# Patient Record
Sex: FEMALE | Race: BLACK OR AFRICAN AMERICAN | NOT HISPANIC OR LATINO | URBAN - METROPOLITAN AREA
[De-identification: names, ages, dates, MRNs, and addresses within clinical notes are randomized per-mention and may not be internally consistent; named-entity substitution may affect disease eponyms.]

---

## 2018-07-19 ENCOUNTER — INPATIENT (INPATIENT)
Facility: HOSPITAL | Age: 73
LOS: 3 days | Discharge: HOME | End: 2018-07-23
Attending: SPECIALIST | Admitting: SPECIALIST
Payer: MEDICARE

## 2018-07-19 VITALS
HEART RATE: 58 BPM | OXYGEN SATURATION: 100 % | RESPIRATION RATE: 18 BRPM | SYSTOLIC BLOOD PRESSURE: 150 MMHG | DIASTOLIC BLOOD PRESSURE: 82 MMHG | TEMPERATURE: 98 F

## 2018-07-19 DIAGNOSIS — R10.9 UNSPECIFIED ABDOMINAL PAIN: ICD-10-CM

## 2018-07-19 LAB
APTT BLD: 29.4 SEC — SIGNIFICANT CHANGE UP (ref 27.5–37.4)
BASE EXCESS BLDV CALC-SCNC: 5.5 MMOL/L — SIGNIFICANT CHANGE UP
BASOPHILS # BLD AUTO: 0.03 K/UL — SIGNIFICANT CHANGE UP (ref 0–0.2)
BASOPHILS NFR BLD AUTO: 0.3 % — SIGNIFICANT CHANGE UP (ref 0–2)
BLD GP AB SCN SERPL QL: NEGATIVE — SIGNIFICANT CHANGE UP
BLOOD GAS VENOUS - CREATININE: 0.86 MG/DL — SIGNIFICANT CHANGE UP (ref 0.5–1.3)
BUN SERPL-MCNC: 12 MG/DL — SIGNIFICANT CHANGE UP (ref 7–23)
CALCIUM SERPL-MCNC: 9.9 MG/DL — SIGNIFICANT CHANGE UP (ref 8.4–10.5)
CHLORIDE BLDV-SCNC: 106 MMOL/L — SIGNIFICANT CHANGE UP (ref 96–108)
CHLORIDE SERPL-SCNC: 101 MMOL/L — SIGNIFICANT CHANGE UP (ref 98–107)
CO2 SERPL-SCNC: 24 MMOL/L — SIGNIFICANT CHANGE UP (ref 22–31)
CREAT SERPL-MCNC: 0.93 MG/DL — SIGNIFICANT CHANGE UP (ref 0.5–1.3)
EOSINOPHIL # BLD AUTO: 0.13 K/UL — SIGNIFICANT CHANGE UP (ref 0–0.5)
EOSINOPHIL NFR BLD AUTO: 1.5 % — SIGNIFICANT CHANGE UP (ref 0–6)
GAS PNL BLDV: 135 MMOL/L — LOW (ref 136–146)
GLUCOSE BLDV-MCNC: 153 — HIGH (ref 70–99)
GLUCOSE SERPL-MCNC: 150 MG/DL — HIGH (ref 70–99)
HCO3 BLDV-SCNC: 27 MMOL/L — SIGNIFICANT CHANGE UP (ref 20–27)
HCT VFR BLD CALC: 37.7 % — SIGNIFICANT CHANGE UP (ref 34.5–45)
HCT VFR BLDV CALC: 38.7 % — SIGNIFICANT CHANGE UP (ref 34.5–45)
HGB BLD-MCNC: 12.2 G/DL — SIGNIFICANT CHANGE UP (ref 11.5–15.5)
HGB BLDV-MCNC: 12.6 G/DL — SIGNIFICANT CHANGE UP (ref 11.5–15.5)
IMM GRANULOCYTES # BLD AUTO: 0.04 # — SIGNIFICANT CHANGE UP
IMM GRANULOCYTES NFR BLD AUTO: 0.5 % — SIGNIFICANT CHANGE UP (ref 0–1.5)
INR BLD: 1.08 — SIGNIFICANT CHANGE UP (ref 0.88–1.17)
LACTATE BLDV-MCNC: 1.5 MMOL/L — SIGNIFICANT CHANGE UP (ref 0.5–2)
LIDOCAIN IGE QN: 266.6 U/L — HIGH (ref 7–60)
LYMPHOCYTES # BLD AUTO: 2.39 K/UL — SIGNIFICANT CHANGE UP (ref 1–3.3)
LYMPHOCYTES # BLD AUTO: 27.2 % — SIGNIFICANT CHANGE UP (ref 13–44)
MCHC RBC-ENTMCNC: 29.7 PG — SIGNIFICANT CHANGE UP (ref 27–34)
MCHC RBC-ENTMCNC: 32.4 % — SIGNIFICANT CHANGE UP (ref 32–36)
MCV RBC AUTO: 91.7 FL — SIGNIFICANT CHANGE UP (ref 80–100)
MONOCYTES # BLD AUTO: 0.92 K/UL — HIGH (ref 0–0.9)
MONOCYTES NFR BLD AUTO: 10.5 % — SIGNIFICANT CHANGE UP (ref 2–14)
NEUTROPHILS # BLD AUTO: 5.27 K/UL — SIGNIFICANT CHANGE UP (ref 1.8–7.4)
NEUTROPHILS NFR BLD AUTO: 60 % — SIGNIFICANT CHANGE UP (ref 43–77)
NRBC # FLD: 0 — SIGNIFICANT CHANGE UP
PCO2 BLDV: 53 MMHG — HIGH (ref 41–51)
PH BLDV: 7.38 PH — SIGNIFICANT CHANGE UP (ref 7.32–7.43)
PLATELET # BLD AUTO: 188 K/UL — SIGNIFICANT CHANGE UP (ref 150–400)
PMV BLD: 10.6 FL — SIGNIFICANT CHANGE UP (ref 7–13)
PO2 BLDV: 28 MMHG — LOW (ref 35–40)
POTASSIUM BLDV-SCNC: 3.8 MMOL/L — SIGNIFICANT CHANGE UP (ref 3.4–4.5)
POTASSIUM SERPL-MCNC: 4.1 MMOL/L — SIGNIFICANT CHANGE UP (ref 3.5–5.3)
POTASSIUM SERPL-SCNC: 4.1 MMOL/L — SIGNIFICANT CHANGE UP (ref 3.5–5.3)
PROTHROM AB SERPL-ACNC: 12 SEC — SIGNIFICANT CHANGE UP (ref 9.8–13.1)
RBC # BLD: 4.11 M/UL — SIGNIFICANT CHANGE UP (ref 3.8–5.2)
RBC # FLD: 12.5 % — SIGNIFICANT CHANGE UP (ref 10.3–14.5)
RH IG SCN BLD-IMP: POSITIVE — SIGNIFICANT CHANGE UP
SAO2 % BLDV: 44.2 % — LOW (ref 60–85)
SODIUM SERPL-SCNC: 140 MMOL/L — SIGNIFICANT CHANGE UP (ref 135–145)
WBC # BLD: 8.78 K/UL — SIGNIFICANT CHANGE UP (ref 3.8–10.5)
WBC # FLD AUTO: 8.78 K/UL — SIGNIFICANT CHANGE UP (ref 3.8–10.5)

## 2018-07-19 PROCEDURE — 74177 CT ABD & PELVIS W/CONTRAST: CPT | Mod: 26

## 2018-07-19 PROCEDURE — 71046 X-RAY EXAM CHEST 2 VIEWS: CPT | Mod: 26

## 2018-07-19 PROCEDURE — 74019 RADEX ABDOMEN 2 VIEWS: CPT | Mod: 26

## 2018-07-19 RX ORDER — ASPIRIN/CALCIUM CARB/MAGNESIUM 324 MG
325 TABLET ORAL DAILY
Qty: 0 | Refills: 0 | Status: DISCONTINUED | OUTPATIENT
Start: 2018-07-19 | End: 2018-07-23

## 2018-07-19 RX ORDER — ACETAMINOPHEN 500 MG
975 TABLET ORAL ONCE
Qty: 0 | Refills: 0 | Status: DISCONTINUED | OUTPATIENT
Start: 2018-07-19 | End: 2018-07-19

## 2018-07-19 RX ORDER — FAMOTIDINE 10 MG/ML
20 INJECTION INTRAVENOUS ONCE
Qty: 0 | Refills: 0 | Status: COMPLETED | OUTPATIENT
Start: 2018-07-19 | End: 2018-07-19

## 2018-07-19 RX ORDER — SODIUM CHLORIDE 9 MG/ML
1000 INJECTION INTRAMUSCULAR; INTRAVENOUS; SUBCUTANEOUS ONCE
Qty: 0 | Refills: 0 | Status: COMPLETED | OUTPATIENT
Start: 2018-07-19 | End: 2018-07-19

## 2018-07-19 RX ORDER — ATENOLOL 25 MG/1
50 TABLET ORAL DAILY
Qty: 0 | Refills: 0 | Status: DISCONTINUED | OUTPATIENT
Start: 2018-07-19 | End: 2018-07-23

## 2018-07-19 RX ORDER — ACETAMINOPHEN 500 MG
1000 TABLET ORAL ONCE
Qty: 0 | Refills: 0 | Status: COMPLETED | OUTPATIENT
Start: 2018-07-20 | End: 2018-07-20

## 2018-07-19 RX ORDER — ACETAMINOPHEN 500 MG
1000 TABLET ORAL ONCE
Qty: 0 | Refills: 0 | Status: COMPLETED | OUTPATIENT
Start: 2018-07-19 | End: 2018-07-19

## 2018-07-19 RX ORDER — LISINOPRIL 2.5 MG/1
40 TABLET ORAL DAILY
Qty: 0 | Refills: 0 | Status: DISCONTINUED | OUTPATIENT
Start: 2018-07-19 | End: 2018-07-23

## 2018-07-19 RX ORDER — MORPHINE SULFATE 50 MG/1
4 CAPSULE, EXTENDED RELEASE ORAL ONCE
Qty: 0 | Refills: 0 | Status: DISCONTINUED | OUTPATIENT
Start: 2018-07-19 | End: 2018-07-19

## 2018-07-19 RX ORDER — HYDROCHLOROTHIAZIDE 25 MG
25 TABLET ORAL DAILY
Qty: 0 | Refills: 0 | Status: DISCONTINUED | OUTPATIENT
Start: 2018-07-20 | End: 2018-07-23

## 2018-07-19 RX ORDER — KETOROLAC TROMETHAMINE 30 MG/ML
15 SYRINGE (ML) INJECTION ONCE
Qty: 0 | Refills: 0 | Status: DISCONTINUED | OUTPATIENT
Start: 2018-07-19 | End: 2018-07-19

## 2018-07-19 RX ORDER — SODIUM CHLORIDE 9 MG/ML
1000 INJECTION, SOLUTION INTRAVENOUS
Qty: 0 | Refills: 0 | Status: DISCONTINUED | OUTPATIENT
Start: 2018-07-19 | End: 2018-07-20

## 2018-07-19 RX ORDER — ONDANSETRON 8 MG/1
4 TABLET, FILM COATED ORAL EVERY 6 HOURS
Qty: 0 | Refills: 0 | Status: DISCONTINUED | OUTPATIENT
Start: 2018-07-19 | End: 2018-07-23

## 2018-07-19 RX ORDER — HYDROMORPHONE HYDROCHLORIDE 2 MG/ML
1 INJECTION INTRAMUSCULAR; INTRAVENOUS; SUBCUTANEOUS EVERY 4 HOURS
Qty: 0 | Refills: 0 | Status: DISCONTINUED | OUTPATIENT
Start: 2018-07-19 | End: 2018-07-23

## 2018-07-19 RX ORDER — HYDROMORPHONE HYDROCHLORIDE 2 MG/ML
0.5 INJECTION INTRAMUSCULAR; INTRAVENOUS; SUBCUTANEOUS EVERY 4 HOURS
Qty: 0 | Refills: 0 | Status: DISCONTINUED | OUTPATIENT
Start: 2018-07-19 | End: 2018-07-23

## 2018-07-19 RX ORDER — HEPARIN SODIUM 5000 [USP'U]/ML
5000 INJECTION INTRAVENOUS; SUBCUTANEOUS EVERY 8 HOURS
Qty: 0 | Refills: 0 | Status: DISCONTINUED | OUTPATIENT
Start: 2018-07-19 | End: 2018-07-22

## 2018-07-19 RX ADMIN — MORPHINE SULFATE 4 MILLIGRAM(S): 50 CAPSULE, EXTENDED RELEASE ORAL at 19:49

## 2018-07-19 RX ADMIN — MORPHINE SULFATE 4 MILLIGRAM(S): 50 CAPSULE, EXTENDED RELEASE ORAL at 16:55

## 2018-07-19 RX ADMIN — Medication 400 MILLIGRAM(S): at 22:57

## 2018-07-19 RX ADMIN — SODIUM CHLORIDE 2000 MILLILITER(S): 9 INJECTION INTRAMUSCULAR; INTRAVENOUS; SUBCUTANEOUS at 15:30

## 2018-07-19 RX ADMIN — MORPHINE SULFATE 4 MILLIGRAM(S): 50 CAPSULE, EXTENDED RELEASE ORAL at 20:12

## 2018-07-19 RX ADMIN — Medication 1000 MILLIGRAM(S): at 23:57

## 2018-07-19 RX ADMIN — SODIUM CHLORIDE 1000 MILLILITER(S): 9 INJECTION INTRAMUSCULAR; INTRAVENOUS; SUBCUTANEOUS at 19:35

## 2018-07-19 RX ADMIN — FAMOTIDINE 20 MILLIGRAM(S): 10 INJECTION INTRAVENOUS at 15:36

## 2018-07-19 RX ADMIN — MORPHINE SULFATE 4 MILLIGRAM(S): 50 CAPSULE, EXTENDED RELEASE ORAL at 15:36

## 2018-07-19 NOTE — H&P ADULT - ASSESSMENT
Wendie Hobbs is a 72 y.o. woman with history of DM, CAD s/p stents (2003), HLD, and HTN who presented to the ED with 2 weeks of increasing epigastric and LUQ abdominal pain found to have acute pancreatitis.     Plan:   - NPO  - IVF  - Pain management  - Serial abdominal exams  - Discussed with Dr. Lorraine Hay, PGY2  [id number]

## 2018-07-19 NOTE — H&P ADULT - NSHPLABSRESULTS_GEN_ALL_CORE
CBC (07-19 @ 14:26)                              12.2                           8.78    )----------------(  188        60.0  % Neutrophils, 27.2  % Lymphocytes, ANC: 5.27                                37.7                  BMP (07-19 @ 14:55)             140     |  101     |  12    		Ca++ --      Ca 9.9                ---------------------------------( 150<H>		Mg --                 4.1     |  24      |  0.93  			Ph --          Coags (07-19 @ 15:42)  aPTT 29.4 / INR 1.08 / PT 12.0    ABG (07-19 @ 14:26)      /  /  /  /  / %     Lactate:   1.5    VBG (07-19 @ 14:26)     7.38 / 53<H> / 28<L> / 27 / 5.5 / 44.2<L>%    < from: CT Abdomen and Pelvis w/ Oral Cont and w/ IV Cont (07.19.18 @ 20:14) >    LOWER CHEST: Coronary artery and aortic valvular calcifications..    LIVER: Within normal limits.  BILE DUCTS: Normal caliber.  GALLBLADDER: Status post cholecystectomy.  SPLEEN: Within normal limits.  PANCREAS: Mild fat stranding at the pancreatic head an uncinate process   suggestive of mild edematous pancreatitis.. No abnormal pancreatic   enhancement.  ADRENALS: Within normal limits.  KIDNEYS/URETERS: Within normal limits.    BLADDER: Within normal limits.  REPRODUCTIVE ORGANS: Fibroid uterus.    BOWEL: No bowel obstruction. Appendix is normal.  PERITONEUM: No ascites.  VESSELS:  Atherosclerotic changes of the aorta and its branches.  RETROPERITONEUM: No lymphadenopathy.    ABDOMINAL WALL: Tiny fat-containing umbilical hernia.   BONES: Mild degenerative spinal changes.    IMPRESSION:  Mild pancreatitis of the pancreatic head and uncinate process.    < end of copied text >

## 2018-07-19 NOTE — H&P ADULT - NSHPPHYSICALEXAM_GEN_ALL_CORE
Constitutional: NAD, AOx3  Respiratory: nonlabored  Cardiovascular: normotensive, regular rate  Gastrointestinal: soft, distended, epigastric > LUQ pain, no rebound, no guarding  Extremities: warm, well perfused

## 2018-07-19 NOTE — ED PROVIDER NOTE - MEDICAL DECISION MAKING DETAILS
72F presenting with 2 weeks ago abd pain in epigastric region. Passing BM and flatus. DDx includes gastritis, gerd, pancreatitis with low suspicion of obstructive process. WIll obtain CT and reassess after labs

## 2018-07-19 NOTE — H&P ADULT - HISTORY OF PRESENT ILLNESS
Wendie Hobbs is a 72 y.o. woman with history of DM, CAD s/p stents (2003), HLD, and HTN who presented to the ED with 2 weeks of increasing epigastric and LUQ abdominal pain.     The patient states that her pain began after she was started on a new medication, Metformin, by her primary care physician. The patient states that he pain slowly increased and she started to have associated abdominal distention and diarrhea. The patient also states that she began feeling SOB when walking long distances. The patient returned to her primary care physician 1 week ago and her dose of Metformin was decreased, which helped with her pain and other symptoms. However, she was finally prompted to come in the the persistence of her symptoms.     The patient denies any associated fever, nausea, vomiting, or diaphoresis.

## 2018-07-19 NOTE — ED ADULT TRIAGE NOTE - CHIEF COMPLAINT QUOTE
Patient takes metformin, dosage recently increased and now has had abdominal pain, bloating, diarrhea, acidic taste in her mouth, and epigastric abdominal pain for 1.5 weeks.  Endorses labored breathing when walking.  Denies chest pain.  Last BM this morning, reports normal.  .  Arrives from PMD for abdominal distension.   Hx DM, HTN

## 2018-07-19 NOTE — ED PROVIDER NOTE - ATTENDING CONTRIBUTION TO CARE
I performed a face to face evaluation of this patient and performed a full history and physical examination on the patient.  I agree with the resident's history, physical examination, and plan of the patient. Pt with abd pain and increasing distention x 2 weeks, + flatus, no dysuria, n/v/d, abd distended soft, mild diffuse ttp, no rebound + BS

## 2018-07-19 NOTE — ED PROVIDER NOTE - OBJECTIVE STATEMENT
72F with PMH DM, HTN, HLD, CAD, GERD presenting with abdominal pain x 2 weeks, epigastric region. States that near onset her pcp increased her metformin dose and began having diarrhea and epigastric pain. Symptoms worse with food. Decreased appetite because of inability to tolerate PO. Mild headache associated with symptoms along with some shortness of breath. States that diarrhea has now resolved, now with small stools daily. Denies fever, chills, cp, n/v, dysuria

## 2018-07-20 LAB
ALBUMIN SERPL ELPH-MCNC: 3.1 G/DL — LOW (ref 3.3–5)
ALP SERPL-CCNC: 66 U/L — SIGNIFICANT CHANGE UP (ref 40–120)
ALT FLD-CCNC: 19 U/L — SIGNIFICANT CHANGE UP (ref 4–33)
AMYLASE P1 CFR SERPL: 93 U/L — SIGNIFICANT CHANGE UP (ref 25–125)
APPEARANCE UR: CLEAR — SIGNIFICANT CHANGE UP
AST SERPL-CCNC: 20 U/L — SIGNIFICANT CHANGE UP (ref 4–32)
BILIRUB DIRECT SERPL-MCNC: 0.1 MG/DL — SIGNIFICANT CHANGE UP (ref 0.1–0.2)
BILIRUB SERPL-MCNC: 0.6 MG/DL — SIGNIFICANT CHANGE UP (ref 0.2–1.2)
BILIRUB UR-MCNC: NEGATIVE — SIGNIFICANT CHANGE UP
BLOOD UR QL VISUAL: NEGATIVE — SIGNIFICANT CHANGE UP
BUN SERPL-MCNC: 9 MG/DL — SIGNIFICANT CHANGE UP (ref 7–23)
CALCIUM SERPL-MCNC: 8.9 MG/DL — SIGNIFICANT CHANGE UP (ref 8.4–10.5)
CHLORIDE SERPL-SCNC: 103 MMOL/L — SIGNIFICANT CHANGE UP (ref 98–107)
CO2 SERPL-SCNC: 25 MMOL/L — SIGNIFICANT CHANGE UP (ref 22–31)
COLOR SPEC: COLORLESS — SIGNIFICANT CHANGE UP
CREAT SERPL-MCNC: 0.8 MG/DL — SIGNIFICANT CHANGE UP (ref 0.5–1.3)
GLUCOSE BLDC GLUCOMTR-MCNC: 104 MG/DL — HIGH (ref 70–99)
GLUCOSE BLDC GLUCOMTR-MCNC: 131 MG/DL — HIGH (ref 70–99)
GLUCOSE BLDC GLUCOMTR-MCNC: 231 MG/DL — HIGH (ref 70–99)
GLUCOSE SERPL-MCNC: 132 MG/DL — HIGH (ref 70–99)
GLUCOSE UR-MCNC: NEGATIVE — SIGNIFICANT CHANGE UP
HCT VFR BLD CALC: 34.8 % — SIGNIFICANT CHANGE UP (ref 34.5–45)
HGB BLD-MCNC: 11.6 G/DL — SIGNIFICANT CHANGE UP (ref 11.5–15.5)
KETONES UR-MCNC: NEGATIVE — SIGNIFICANT CHANGE UP
LEUKOCYTE ESTERASE UR-ACNC: NEGATIVE — SIGNIFICANT CHANGE UP
LIDOCAIN IGE QN: 104 U/L — HIGH (ref 7–60)
MAGNESIUM SERPL-MCNC: 1.2 MG/DL — LOW (ref 1.6–2.6)
MCHC RBC-ENTMCNC: 30.5 PG — SIGNIFICANT CHANGE UP (ref 27–34)
MCHC RBC-ENTMCNC: 33.3 % — SIGNIFICANT CHANGE UP (ref 32–36)
MCV RBC AUTO: 91.6 FL — SIGNIFICANT CHANGE UP (ref 80–100)
NITRITE UR-MCNC: NEGATIVE — SIGNIFICANT CHANGE UP
NRBC # FLD: 0 — SIGNIFICANT CHANGE UP
PH UR: 6.5 — SIGNIFICANT CHANGE UP (ref 4.6–8)
PHOSPHATE SERPL-MCNC: 3.9 MG/DL — SIGNIFICANT CHANGE UP (ref 2.5–4.5)
PLATELET # BLD AUTO: 169 K/UL — SIGNIFICANT CHANGE UP (ref 150–400)
PMV BLD: 10.7 FL — SIGNIFICANT CHANGE UP (ref 7–13)
POTASSIUM SERPL-MCNC: 3.7 MMOL/L — SIGNIFICANT CHANGE UP (ref 3.5–5.3)
POTASSIUM SERPL-SCNC: 3.7 MMOL/L — SIGNIFICANT CHANGE UP (ref 3.5–5.3)
PROT SERPL-MCNC: 6.4 G/DL — SIGNIFICANT CHANGE UP (ref 6–8.3)
PROT UR-MCNC: NEGATIVE MG/DL — SIGNIFICANT CHANGE UP
RBC # BLD: 3.8 M/UL — SIGNIFICANT CHANGE UP (ref 3.8–5.2)
RBC # FLD: 12.8 % — SIGNIFICANT CHANGE UP (ref 10.3–14.5)
RBC CASTS # UR COMP ASSIST: SIGNIFICANT CHANGE UP (ref 0–?)
SODIUM SERPL-SCNC: 141 MMOL/L — SIGNIFICANT CHANGE UP (ref 135–145)
SP GR SPEC: 1.01 — SIGNIFICANT CHANGE UP (ref 1–1.04)
SQUAMOUS # UR AUTO: SIGNIFICANT CHANGE UP
UROBILINOGEN FLD QL: NORMAL MG/DL — SIGNIFICANT CHANGE UP
WBC # BLD: 7.34 K/UL — SIGNIFICANT CHANGE UP (ref 3.8–10.5)
WBC # FLD AUTO: 7.34 K/UL — SIGNIFICANT CHANGE UP (ref 3.8–10.5)
WBC UR QL: SIGNIFICANT CHANGE UP (ref 0–?)

## 2018-07-20 RX ORDER — MAGNESIUM SULFATE 500 MG/ML
2 VIAL (ML) INJECTION ONCE
Qty: 0 | Refills: 0 | Status: DISCONTINUED | OUTPATIENT
Start: 2018-07-20 | End: 2018-07-20

## 2018-07-20 RX ORDER — MAGNESIUM SULFATE 500 MG/ML
2 VIAL (ML) INJECTION ONCE
Qty: 0 | Refills: 0 | Status: COMPLETED | OUTPATIENT
Start: 2018-07-20 | End: 2018-07-20

## 2018-07-20 RX ORDER — DEXTROSE 50 % IN WATER 50 %
15 SYRINGE (ML) INTRAVENOUS ONCE
Qty: 0 | Refills: 0 | Status: DISCONTINUED | OUTPATIENT
Start: 2018-07-20 | End: 2018-07-23

## 2018-07-20 RX ORDER — SODIUM CHLORIDE 9 MG/ML
1000 INJECTION, SOLUTION INTRAVENOUS
Qty: 0 | Refills: 0 | Status: DISCONTINUED | OUTPATIENT
Start: 2018-07-20 | End: 2018-07-23

## 2018-07-20 RX ORDER — DEXTROSE 50 % IN WATER 50 %
12.5 SYRINGE (ML) INTRAVENOUS ONCE
Qty: 0 | Refills: 0 | Status: DISCONTINUED | OUTPATIENT
Start: 2018-07-20 | End: 2018-07-23

## 2018-07-20 RX ORDER — DEXTROSE 50 % IN WATER 50 %
25 SYRINGE (ML) INTRAVENOUS ONCE
Qty: 0 | Refills: 0 | Status: DISCONTINUED | OUTPATIENT
Start: 2018-07-20 | End: 2018-07-23

## 2018-07-20 RX ORDER — POTASSIUM CHLORIDE 20 MEQ
40 PACKET (EA) ORAL ONCE
Qty: 0 | Refills: 0 | Status: COMPLETED | OUTPATIENT
Start: 2018-07-20 | End: 2018-07-20

## 2018-07-20 RX ORDER — DEXTROSE MONOHYDRATE, SODIUM CHLORIDE, AND POTASSIUM CHLORIDE 50; .745; 4.5 G/1000ML; G/1000ML; G/1000ML
1000 INJECTION, SOLUTION INTRAVENOUS
Qty: 0 | Refills: 0 | Status: DISCONTINUED | OUTPATIENT
Start: 2018-07-20 | End: 2018-07-22

## 2018-07-20 RX ORDER — HYDRALAZINE HCL 50 MG
5 TABLET ORAL ONCE
Qty: 0 | Refills: 0 | Status: COMPLETED | OUTPATIENT
Start: 2018-07-20 | End: 2018-07-20

## 2018-07-20 RX ORDER — POTASSIUM CHLORIDE 20 MEQ
10 PACKET (EA) ORAL
Qty: 0 | Refills: 0 | Status: COMPLETED | OUTPATIENT
Start: 2018-07-20 | End: 2018-07-20

## 2018-07-20 RX ORDER — INSULIN LISPRO 100/ML
VIAL (ML) SUBCUTANEOUS AT BEDTIME
Qty: 0 | Refills: 0 | Status: DISCONTINUED | OUTPATIENT
Start: 2018-07-20 | End: 2018-07-23

## 2018-07-20 RX ORDER — GLUCAGON INJECTION, SOLUTION 0.5 MG/.1ML
1 INJECTION, SOLUTION SUBCUTANEOUS ONCE
Qty: 0 | Refills: 0 | Status: DISCONTINUED | OUTPATIENT
Start: 2018-07-20 | End: 2018-07-23

## 2018-07-20 RX ORDER — INSULIN LISPRO 100/ML
VIAL (ML) SUBCUTANEOUS
Qty: 0 | Refills: 0 | Status: DISCONTINUED | OUTPATIENT
Start: 2018-07-20 | End: 2018-07-23

## 2018-07-20 RX ADMIN — HEPARIN SODIUM 5000 UNIT(S): 5000 INJECTION INTRAVENOUS; SUBCUTANEOUS at 22:14

## 2018-07-20 RX ADMIN — SODIUM CHLORIDE 100 MILLILITER(S): 9 INJECTION, SOLUTION INTRAVENOUS at 00:00

## 2018-07-20 RX ADMIN — HYDROMORPHONE HYDROCHLORIDE 1 MILLIGRAM(S): 2 INJECTION INTRAMUSCULAR; INTRAVENOUS; SUBCUTANEOUS at 19:05

## 2018-07-20 RX ADMIN — Medication 50 GRAM(S): at 14:11

## 2018-07-20 RX ADMIN — Medication 400 MILLIGRAM(S): at 12:10

## 2018-07-20 RX ADMIN — LISINOPRIL 40 MILLIGRAM(S): 2.5 TABLET ORAL at 05:11

## 2018-07-20 RX ADMIN — Medication 100 MILLIEQUIVALENT(S): at 13:59

## 2018-07-20 RX ADMIN — Medication 100 MILLIEQUIVALENT(S): at 12:50

## 2018-07-20 RX ADMIN — DEXTROSE MONOHYDRATE, SODIUM CHLORIDE, AND POTASSIUM CHLORIDE 100 MILLILITER(S): 50; .745; 4.5 INJECTION, SOLUTION INTRAVENOUS at 17:25

## 2018-07-20 RX ADMIN — Medication 40 MILLIEQUIVALENT(S): at 05:52

## 2018-07-20 RX ADMIN — Medication 100 MILLIEQUIVALENT(S): at 11:46

## 2018-07-20 RX ADMIN — HEPARIN SODIUM 5000 UNIT(S): 5000 INJECTION INTRAVENOUS; SUBCUTANEOUS at 14:12

## 2018-07-20 RX ADMIN — SODIUM CHLORIDE 100 MILLILITER(S): 9 INJECTION, SOLUTION INTRAVENOUS at 12:10

## 2018-07-20 RX ADMIN — Medication 25 MILLIGRAM(S): at 05:11

## 2018-07-20 RX ADMIN — HYDROMORPHONE HYDROCHLORIDE 1 MILLIGRAM(S): 2 INJECTION INTRAMUSCULAR; INTRAVENOUS; SUBCUTANEOUS at 18:39

## 2018-07-20 RX ADMIN — Medication 325 MILLIGRAM(S): at 14:12

## 2018-07-20 RX ADMIN — ATENOLOL 50 MILLIGRAM(S): 25 TABLET ORAL at 05:12

## 2018-07-20 RX ADMIN — Medication 50 GRAM(S): at 05:52

## 2018-07-20 RX ADMIN — Medication 5 MILLIGRAM(S): at 23:52

## 2018-07-20 RX ADMIN — Medication 400 MILLIGRAM(S): at 05:11

## 2018-07-20 RX ADMIN — Medication 1000 MILLIGRAM(S): at 12:30

## 2018-07-20 RX ADMIN — Medication 50 GRAM(S): at 09:38

## 2018-07-20 NOTE — PROGRESS NOTE ADULT - SUBJECTIVE AND OBJECTIVE BOX
GENERAL SURGERY DAILY PROGRESS NOTE:     Subjective:    Patient was seen and examined this morning during morning rounds. Her pain is much improved from admission. She is not having nausea or vomiting and is NPO. She has not had bowel function since being admitted.       Objective:    NAD, awake and alert  Respirations nonlabored  Abdomen soft, nontender, nondistended  No guarding or rebound tenderness     MEDICATIONS  (STANDING):  acetaminophen  IVPB. 1000 milliGRAM(s) IV Intermittent once  aspirin 325 milliGRAM(s) Oral daily  ATENolol  Tablet 50 milliGRAM(s) Oral daily  heparin  Injectable 5000 Unit(s) SubCutaneous every 8 hours  hydrochlorothiazide 25 milliGRAM(s) Oral daily  lactated ringers. 1000 milliLiter(s) (100 mL/Hr) IV Continuous <Continuous>  lisinopril 40 milliGRAM(s) Oral daily    MEDICATIONS  (PRN):  HYDROmorphone  Injectable 0.5 milliGRAM(s) IV Push every 4 hours PRN Moderate Pain (4 - 6)  HYDROmorphone  Injectable 1 milliGRAM(s) IV Push every 4 hours PRN Severe Pain (7 - 10)  ondansetron Injectable 4 milliGRAM(s) IV Push every 6 hours PRN Nausea and/or Vomiting      Vital Signs Last 24 Hrs  T(C): 36.8 (2018 05:01), Max: 36.8 (2018 05:01)  T(F): 98.3 (2018 05:01), Max: 98.3 (2018 05:01)  HR: 56 (2018 05:01) (52 - 61)  BP: 125/65 (2018 05:01) (125/65 - 162/62)  BP(mean): --  RR: 16 (2018 05:01) (16 - 18)  SpO2: 100% (2018 05:01) (99% - 100%)    I&O's Detail    2018 07:01  -  2018 07:00  --------------------------------------------------------  IN:    lactated ringers.: 400 mL  Total IN: 400 mL    OUT:    Voided: 300 mL  Total OUT: 300 mL    Total NET: 100 mL          Daily     Daily     LABS:                        11.6   7.34  )-----------( 169      ( 2018 04:50 )             34.8     07-20    141  |  103  |  9   ----------------------------<  132<H>  3.7   |  25  |  0.80    Ca    8.9      2018 04:50  Phos  3.9     -  Mg     1.2         TPro  6.4  /  Alb  3.1<L>  /  TBili  0.6  /  DBili  0.1  /  AST  20  /  ALT  19  /  AlkPhos  66  07-20    PT/INR - ( 2018 15:42 )   PT: 12.0 SEC;   INR: 1.08          PTT - ( 2018 15:42 )  PTT:29.4 SEC  Urinalysis Basic - ( 2018 06:20 )    Color: COLORLESS / Appearance: CLEAR / S.015 / pH: 6.5  Gluc: NEGATIVE / Ketone: NEGATIVE  / Bili: NEGATIVE / Urobili: NORMAL mg/dL   Blood: NEGATIVE / Protein: NEGATIVE mg/dL / Nitrite: NEGATIVE   Leuk Esterase: NEGATIVE / RBC: 0-2 / WBC 0-2   Sq Epi: OCC / Non Sq Epi: x / Bacteria: x

## 2018-07-20 NOTE — PROGRESS NOTE ADULT - ASSESSMENT
Wendie Hobbs is a 72 y.o. woman with history of DM, CAD s/p stents (2003), HLD, and HTN who presented to the ED with 2 weeks of increasing epigastric and LUQ abdominal pain found to have acute pancreatitis.     Plan:   - NPO  - IVF  - Will consult endocrine - pt was started on glyburide this week  - Pain management  - Serial abdominal exams    [facility]  [id number] Wendie Hobbs is a 72 y.o. woman with history of DM, CAD s/p stents (2003), HLD, and HTN who presented to the ED with 2 weeks of increasing epigastric and LUQ abdominal pain found to have acute pancreatitis.     Plan:   - NPO  - IVF  - Pain management  - Serial abdominal exams    [facility]  [id number]

## 2018-07-21 DIAGNOSIS — E11.65 TYPE 2 DIABETES MELLITUS WITH HYPERGLYCEMIA: ICD-10-CM

## 2018-07-21 DIAGNOSIS — E78.5 HYPERLIPIDEMIA, UNSPECIFIED: ICD-10-CM

## 2018-07-21 DIAGNOSIS — E78.4 OTHER HYPERLIPIDEMIA: ICD-10-CM

## 2018-07-21 DIAGNOSIS — I10 ESSENTIAL (PRIMARY) HYPERTENSION: ICD-10-CM

## 2018-07-21 DIAGNOSIS — E11.9 TYPE 2 DIABETES MELLITUS WITHOUT COMPLICATIONS: ICD-10-CM

## 2018-07-21 LAB
ALBUMIN SERPL ELPH-MCNC: 3.7 G/DL — SIGNIFICANT CHANGE UP (ref 3.3–5)
ALP SERPL-CCNC: 78 U/L — SIGNIFICANT CHANGE UP (ref 40–120)
ALT FLD-CCNC: 24 U/L — SIGNIFICANT CHANGE UP (ref 4–33)
AST SERPL-CCNC: 25 U/L — SIGNIFICANT CHANGE UP (ref 4–32)
BILIRUB SERPL-MCNC: 0.5 MG/DL — SIGNIFICANT CHANGE UP (ref 0.2–1.2)
BUN SERPL-MCNC: 8 MG/DL — SIGNIFICANT CHANGE UP (ref 7–23)
CALCIUM SERPL-MCNC: 9.4 MG/DL — SIGNIFICANT CHANGE UP (ref 8.4–10.5)
CHLORIDE SERPL-SCNC: 99 MMOL/L — SIGNIFICANT CHANGE UP (ref 98–107)
CO2 SERPL-SCNC: 23 MMOL/L — SIGNIFICANT CHANGE UP (ref 22–31)
CREAT SERPL-MCNC: 0.77 MG/DL — SIGNIFICANT CHANGE UP (ref 0.5–1.3)
GLUCOSE BLDC GLUCOMTR-MCNC: 148 MG/DL — HIGH (ref 70–99)
GLUCOSE BLDC GLUCOMTR-MCNC: 171 MG/DL — HIGH (ref 70–99)
GLUCOSE BLDC GLUCOMTR-MCNC: 223 MG/DL — HIGH (ref 70–99)
GLUCOSE BLDC GLUCOMTR-MCNC: 264 MG/DL — HIGH (ref 70–99)
GLUCOSE SERPL-MCNC: 254 MG/DL — HIGH (ref 70–99)
HBA1C BLD-MCNC: 12.1 % — HIGH (ref 4–5.6)
HCT VFR BLD CALC: 38.9 % — SIGNIFICANT CHANGE UP (ref 34.5–45)
HGB BLD-MCNC: 12.8 G/DL — SIGNIFICANT CHANGE UP (ref 11.5–15.5)
LIDOCAIN IGE QN: 79.5 U/L — HIGH (ref 7–60)
MAGNESIUM SERPL-MCNC: 1.8 MG/DL — SIGNIFICANT CHANGE UP (ref 1.6–2.6)
MCHC RBC-ENTMCNC: 30 PG — SIGNIFICANT CHANGE UP (ref 27–34)
MCHC RBC-ENTMCNC: 32.9 % — SIGNIFICANT CHANGE UP (ref 32–36)
MCV RBC AUTO: 91.3 FL — SIGNIFICANT CHANGE UP (ref 80–100)
NRBC # FLD: 0 — SIGNIFICANT CHANGE UP
PHOSPHATE SERPL-MCNC: 2.2 MG/DL — LOW (ref 2.5–4.5)
PLATELET # BLD AUTO: 206 K/UL — SIGNIFICANT CHANGE UP (ref 150–400)
PMV BLD: 11.6 FL — SIGNIFICANT CHANGE UP (ref 7–13)
POTASSIUM SERPL-MCNC: 4.4 MMOL/L — SIGNIFICANT CHANGE UP (ref 3.5–5.3)
POTASSIUM SERPL-SCNC: 4.4 MMOL/L — SIGNIFICANT CHANGE UP (ref 3.5–5.3)
PROT SERPL-MCNC: 7.6 G/DL — SIGNIFICANT CHANGE UP (ref 6–8.3)
RBC # BLD: 4.26 M/UL — SIGNIFICANT CHANGE UP (ref 3.8–5.2)
RBC # FLD: 12.6 % — SIGNIFICANT CHANGE UP (ref 10.3–14.5)
SODIUM SERPL-SCNC: 135 MMOL/L — SIGNIFICANT CHANGE UP (ref 135–145)
SPECIMEN SOURCE: SIGNIFICANT CHANGE UP
WBC # BLD: 6.89 K/UL — SIGNIFICANT CHANGE UP (ref 3.8–10.5)
WBC # FLD AUTO: 6.89 K/UL — SIGNIFICANT CHANGE UP (ref 3.8–10.5)

## 2018-07-21 PROCEDURE — 99223 1ST HOSP IP/OBS HIGH 75: CPT | Mod: GC

## 2018-07-21 RX ORDER — ACETAMINOPHEN 500 MG
650 TABLET ORAL ONCE
Qty: 0 | Refills: 0 | Status: COMPLETED | OUTPATIENT
Start: 2018-07-21 | End: 2018-07-21

## 2018-07-21 RX ORDER — INSULIN LISPRO 100/ML
VIAL (ML) SUBCUTANEOUS
Qty: 0 | Refills: 0 | Status: DISCONTINUED | OUTPATIENT
Start: 2018-07-21 | End: 2018-07-21

## 2018-07-21 RX ORDER — INSULIN LISPRO 100/ML
VIAL (ML) SUBCUTANEOUS AT BEDTIME
Qty: 0 | Refills: 0 | Status: DISCONTINUED | OUTPATIENT
Start: 2018-07-21 | End: 2018-07-21

## 2018-07-21 RX ORDER — MAGNESIUM SULFATE 500 MG/ML
1 VIAL (ML) INJECTION ONCE
Qty: 0 | Refills: 0 | Status: COMPLETED | OUTPATIENT
Start: 2018-07-21 | End: 2018-07-21

## 2018-07-21 RX ORDER — POTASSIUM PHOSPHATE, MONOBASIC POTASSIUM PHOSPHATE, DIBASIC 236; 224 MG/ML; MG/ML
15 INJECTION, SOLUTION INTRAVENOUS ONCE
Qty: 0 | Refills: 0 | Status: COMPLETED | OUTPATIENT
Start: 2018-07-21 | End: 2018-07-21

## 2018-07-21 RX ORDER — INSULIN GLARGINE 100 [IU]/ML
17 INJECTION, SOLUTION SUBCUTANEOUS AT BEDTIME
Qty: 0 | Refills: 0 | Status: DISCONTINUED | OUTPATIENT
Start: 2018-07-21 | End: 2018-07-22

## 2018-07-21 RX ORDER — INSULIN LISPRO 100/ML
5 VIAL (ML) SUBCUTANEOUS
Qty: 0 | Refills: 0 | Status: DISCONTINUED | OUTPATIENT
Start: 2018-07-21 | End: 2018-07-23

## 2018-07-21 RX ADMIN — Medication 25 MILLIGRAM(S): at 06:39

## 2018-07-21 RX ADMIN — HEPARIN SODIUM 5000 UNIT(S): 5000 INJECTION INTRAVENOUS; SUBCUTANEOUS at 06:39

## 2018-07-21 RX ADMIN — ATENOLOL 50 MILLIGRAM(S): 25 TABLET ORAL at 06:39

## 2018-07-21 RX ADMIN — Medication 100 GRAM(S): at 09:02

## 2018-07-21 RX ADMIN — Medication 325 MILLIGRAM(S): at 13:01

## 2018-07-21 RX ADMIN — INSULIN GLARGINE 17 UNIT(S): 100 INJECTION, SOLUTION SUBCUTANEOUS at 22:18

## 2018-07-21 RX ADMIN — POTASSIUM PHOSPHATE, MONOBASIC POTASSIUM PHOSPHATE, DIBASIC 62.5 MILLIMOLE(S): 236; 224 INJECTION, SOLUTION INTRAVENOUS at 10:07

## 2018-07-21 RX ADMIN — Medication 650 MILLIGRAM(S): at 10:00

## 2018-07-21 RX ADMIN — HEPARIN SODIUM 5000 UNIT(S): 5000 INJECTION INTRAVENOUS; SUBCUTANEOUS at 13:01

## 2018-07-21 RX ADMIN — LISINOPRIL 40 MILLIGRAM(S): 2.5 TABLET ORAL at 06:39

## 2018-07-21 RX ADMIN — Medication 650 MILLIGRAM(S): at 09:08

## 2018-07-21 RX ADMIN — Medication 2: at 08:23

## 2018-07-21 RX ADMIN — Medication 3: at 11:52

## 2018-07-21 RX ADMIN — HEPARIN SODIUM 5000 UNIT(S): 5000 INJECTION INTRAVENOUS; SUBCUTANEOUS at 22:18

## 2018-07-21 NOTE — CONSULT NOTE ADULT - ATTENDING COMMENTS
72 y.o. female with h/o Type 2 DM uncontrolled, HTN, CAD presents with pancreatitis.  1. Type 2 DM- Reviewed pathophysiology and blood glucose and Hba1c goals. Recommend basal bolus regimen with Lantus 17 units daily and Humalog 5 units QAc. Will determine outpatient regimen. Unfortunately patient is not a candidate for DDp-IV inhibitors and GLP-1 analog because of pancreatitis and patient is intolerant to Metformin  2. HTN- BP goal is <130/80 and will continue medications  3. Would check lipids and consider statin for CV risk reduction

## 2018-07-21 NOTE — CONSULT NOTE ADULT - SUBJECTIVE AND OBJECTIVE BOX
HPI:  Wendie Hobbs is a 72 y.o. woman with history of DM, CAD s/p stents (2003), HLD, and HTN who presented to the ED with 2 weeks of increasing epigastric and LUQ abdominal pain. The patient states that her pain began after she was started on a new medication, Metformin, by her primary care physician. The patient states that he pain slowly increased and she started to have associated abdominal distention and diarrhea. The patient also states that she began feeling SOB when walking long distances. The patient returned to her primary care physician 1 week ago and her dose of Metformin was decreased, which helped with her pain and other symptoms. However, she was finally prompted to come in the the persistence of her symptoms. The patient denies any associated fever, nausea, vomiting, or diaphoresis. (19 Jul 2018 21:41)    Endocrine Hx  72 year old female from home  with Hx T2DM , 12.1 hbA1c,seen by endocrine service for hyperglycemia and T2DM. She has DM for years initially on metformin 500 mg dose ,recently her dose was increased from 500mg to 1000 BID and she suffered from side effects ( N/V/D) and for last few days she is not taking metformin , last dose last thursday. She is not following any o/p Endocrinologist. She does not follow low carb diet and does not do exercise She eats regular food , loves potato and rice, pasta. Pt never checks her FS.   Pt currently has any hyperglycemia sx like + visual blurriness, polyuria and polydipsia, constipation. She has no known microvascular cx, + CAD, no CVA/PAD.   No recent hospitalizations for DKA/HONK or hypoglycemia.      PAST MEDICAL & SURGICAL HISTORY:  Coronary artery disease  Diabetes  Hyperlipidemia  HTN (hypertension)  Status post cholecystectomy      FAMILY HISTORY:      Social History:  Tobacco - never smoker  ETOH - never used        MEDICATIONS  (STANDING):  aspirin 325 milliGRAM(s) Oral daily  ATENolol  Tablet 50 milliGRAM(s) Oral daily  dextrose 5% + sodium chloride 0.45% with potassium chloride 20 mEq/L 1000 milliLiter(s) (100 mL/Hr) IV Continuous <Continuous>  dextrose 5%. 1000 milliLiter(s) (50 mL/Hr) IV Continuous <Continuous>  dextrose 50% Injectable 12.5 Gram(s) IV Push once  dextrose 50% Injectable 25 Gram(s) IV Push once  dextrose 50% Injectable 25 Gram(s) IV Push once  heparin  Injectable 5000 Unit(s) SubCutaneous every 8 hours  hydrochlorothiazide 25 milliGRAM(s) Oral daily  insulin lispro (HumaLOG) corrective regimen sliding scale   SubCutaneous three times a day before meals  insulin lispro (HumaLOG) corrective regimen sliding scale   SubCutaneous at bedtime  lisinopril 40 milliGRAM(s) Oral daily    MEDICATIONS  (PRN):  dextrose 40% Gel 15 Gram(s) Oral once PRN Blood Glucose LESS THAN 70 milliGRAM(s)/deciliter  glucagon  Injectable 1 milliGRAM(s) IntraMuscular once PRN Glucose LESS THAN 70 milligrams/deciliter  HYDROmorphone  Injectable 0.5 milliGRAM(s) IV Push every 4 hours PRN Moderate Pain (4 - 6)  HYDROmorphone  Injectable 1 milliGRAM(s) IV Push every 4 hours PRN Severe Pain (7 - 10)  ondansetron Injectable 4 milliGRAM(s) IV Push every 6 hours PRN Nausea and/or Vomiting      Allergies    sulfa drugs (Blisters)    Intolerances      Review of Systems:  Constitutional: No fever, good appetite/po intake  Eyes: No blurry vision, diplopia  Neuro: No tremors  HEENT: No pain  Cardiovascular: No chest pain, palpitations  Respiratory: No SOB, no cough  GI: No nausea, vomiting,   : No dysuria, hematuria  Skin: no rash  Psych: no depression  Endocrine: no polyuria, polydipsia  Hem/lymph: no swelling  Osteoporosis: no fractures    ALL OTHER SYSTEMS REVIEWED AND NEGATIVE      PHYSICAL EXAM:  VITALS: T(C): 36.7 (07-21-18 @ 14:10)  T(F): 98 (07-21-18 @ 14:10), Max: 98 (07-20-18 @ 18:00)  HR: 50 (07-21-18 @ 14:10) (50 - 55)  BP: 145/74 (07-21-18 @ 14:10) (124/70 - 198/84)  RR:  (18 - 18)  SpO2:  (100% - 100%)    GENERAL: NAD, well-groomed, well-developed  EYES: No proptosis, no lid lag, anicteric  HEENT:  Atraumatic, Normocephalic, moist mucous membranes  THYROID: Normal size, no palpable nodules  RESPIRATORY: Clear to auscultation bilaterally; No rales, rhonchi, wheezing, or rubs  CARDIOVASCULAR: Regular rate and rhythm; No murmurs; no peripheral edema  GI: Soft, nontender, non distended, normal bowel sounds  SKIN: Dry, intact, No rashes or lesions  NEURO: sensation intact, extraocular movements intact, no tremor, normal reflexes  PSYCH: reactive affect, euthymic mood  CUSHING'S SIGNS: no striae    POCT Blood Glucose.: 148 mg/dL (07-21-18 @ 16:50)  POCT Blood Glucose.: 264 mg/dL (07-21-18 @ 11:35)  POCT Blood Glucose.: 223 mg/dL (07-21-18 @ 07:41)  POCT Blood Glucose.: 231 mg/dL (07-20-18 @ 21:47)  POCT Blood Glucose.: 104 mg/dL (07-20-18 @ 16:56)  POCT Blood Glucose.: 131 mg/dL (07-20-18 @ 11:43)  POCT Blood Glucose.: 181 mg/dL (07-19-18 @ 13:27)                            12.8   6.89  )-----------( 206      ( 21 Jul 2018 06:36 )             38.9       07-21    135  |  99  |  8   ----------------------------<  254<H>  4.4   |  23  |  0.77    EGFR if : 90  EGFR if non : 78    Ca    9.4      07-21  Mg     1.8     07-21  Phos  2.2     07-21    TPro  7.6  /  Alb  3.7  /  TBili  0.5  /  DBili  x   /  AST  25  /  ALT  24  /  AlkPhos  78  07-21      Thyroid Function Tests:      Hemoglobin A1C, Whole Blood: 12.1 % <H> [4.0 - 5.6] (07-21-18 @ 06:36) HPI:   72 y.o. woman with history of DM, CAD s/p stents (2003), HLD, and HTN who presented to the ED with 2 weeks of increasing epigastric and LUQ abdominal pain. The patient states that her pain began after she was started on a new medication, Metformin, by her primary care physician. The patient states that he pain slowly increased and she started to have associated abdominal distention and diarrhea. The patient also states that she began feeling SOB when walking long distances. The patient returned to her primary care physician 1 week ago and her dose of Metformin was decreased, which helped with her pain and other symptoms. However, she was finally prompted to come in the the persistence of her symptoms. The patient denies any associated fever, nausea, vomiting, or diaphoresis. (19 Jul 2018 21:41)    Endocrine Hx  72 year old female from home  with Hx T2DM , 12.1 hbA1c,seen by endocrine service for hyperglycemia and T2DM. She has DM for years initially on metformin 500 mg dose ,recently her dose was increased from 500mg to 1000 BID and she suffered from side effects ( N/V/D) and for last few days she is not taking metformin , last dose last thursday. She is not following any outpatient Endocrinologist. She does not follow low carb diet and does not do exercise She eats regular food , loves potato and rice, pasta. Pt never checks her FS.   Pt currently has any hyperglycemia sx like + visual blurriness, polyuria and polydipsia, constipation. She has no known microvascular cx, + CAD, no CVA/PAD.   No recent hospitalizations for DKA/HONK or hypoglycemia.      PAST MEDICAL & SURGICAL HISTORY:  Coronary artery disease  Diabetes  Hyperlipidemia  HTN (hypertension)  Status post cholecystectomy      FAMILY HISTORY:      Social History:  Tobacco - never smoker  ETOH - never used        MEDICATIONS  (STANDING):  aspirin 325 milliGRAM(s) Oral daily  ATENolol  Tablet 50 milliGRAM(s) Oral daily  dextrose 5% + sodium chloride 0.45% with potassium chloride 20 mEq/L 1000 milliLiter(s) (100 mL/Hr) IV Continuous <Continuous>  dextrose 5%. 1000 milliLiter(s) (50 mL/Hr) IV Continuous <Continuous>  dextrose 50% Injectable 12.5 Gram(s) IV Push once  dextrose 50% Injectable 25 Gram(s) IV Push once  dextrose 50% Injectable 25 Gram(s) IV Push once  heparin  Injectable 5000 Unit(s) SubCutaneous every 8 hours  hydrochlorothiazide 25 milliGRAM(s) Oral daily  insulin lispro (HumaLOG) corrective regimen sliding scale   SubCutaneous three times a day before meals  insulin lispro (HumaLOG) corrective regimen sliding scale   SubCutaneous at bedtime  lisinopril 40 milliGRAM(s) Oral daily    MEDICATIONS  (PRN):  dextrose 40% Gel 15 Gram(s) Oral once PRN Blood Glucose LESS THAN 70 milliGRAM(s)/deciliter  glucagon  Injectable 1 milliGRAM(s) IntraMuscular once PRN Glucose LESS THAN 70 milligrams/deciliter  HYDROmorphone  Injectable 0.5 milliGRAM(s) IV Push every 4 hours PRN Moderate Pain (4 - 6)  HYDROmorphone  Injectable 1 milliGRAM(s) IV Push every 4 hours PRN Severe Pain (7 - 10)  ondansetron Injectable 4 milliGRAM(s) IV Push every 6 hours PRN Nausea and/or Vomiting      Allergies    sulfa drugs (Blisters)    Intolerances      Review of Systems:  Constitutional: No fever, good appetite/po intake  Eyes: No blurry vision, diplopia  Neuro: No tremors  HEENT: No pain  Cardiovascular: No chest pain, palpitations  Respiratory: No SOB, no cough  GI: No nausea, vomiting,   : No dysuria, hematuria  Skin: no rash  Psych: no depression  Endocrine: no polyuria, polydipsia  Hem/lymph: no swelling  Osteoporosis: no fractures    ALL OTHER SYSTEMS REVIEWED AND NEGATIVE      PHYSICAL EXAM:  VITALS: T(C): 36.7 (07-21-18 @ 14:10)  T(F): 98 (07-21-18 @ 14:10), Max: 98 (07-20-18 @ 18:00)  HR: 50 (07-21-18 @ 14:10) (50 - 55)  BP: 145/74 (07-21-18 @ 14:10) (124/70 - 198/84)  RR:  (18 - 18)  SpO2:  (100% - 100%)    GENERAL: NAD, well-groomed, well-developed  EYES: No proptosis, no lid lag, anicteric  HEENT:  Atraumatic, Normocephalic, moist mucous membranes  THYROID: Normal size, no palpable nodules  RESPIRATORY: Clear to auscultation bilaterally; No rales, rhonchi, wheezing, or rubs  CARDIOVASCULAR: Regular rate and rhythm; No murmurs; no peripheral edema  GI: Soft, nontender, non distended, normal bowel sounds  SKIN: Dry, intact, No rashes or lesions  NEURO: sensation intact, extraocular movements intact, no tremor, normal reflexes  PSYCH: reactive affect, euthymic mood  CUSHING'S SIGNS: no striae    POCT Blood Glucose.: 148 mg/dL (07-21-18 @ 16:50)  POCT Blood Glucose.: 264 mg/dL (07-21-18 @ 11:35)  POCT Blood Glucose.: 223 mg/dL (07-21-18 @ 07:41)  POCT Blood Glucose.: 231 mg/dL (07-20-18 @ 21:47)  POCT Blood Glucose.: 104 mg/dL (07-20-18 @ 16:56)  POCT Blood Glucose.: 131 mg/dL (07-20-18 @ 11:43)  POCT Blood Glucose.: 181 mg/dL (07-19-18 @ 13:27)                            12.8   6.89  )-----------( 206      ( 21 Jul 2018 06:36 )             38.9       07-21    135  |  99  |  8   ----------------------------<  254<H>  4.4   |  23  |  0.77    EGFR if : 90  EGFR if non : 78    Ca    9.4      07-21  Mg     1.8     07-21  Phos  2.2     07-21    TPro  7.6  /  Alb  3.7  /  TBili  0.5  /  DBili  x   /  AST  25  /  ALT  24  /  AlkPhos  78  07-21      Thyroid Function Tests:      Hemoglobin A1C, Whole Blood: 12.1 % <H> [4.0 - 5.6] (07-21-18 @ 06:36)

## 2018-07-21 NOTE — CONSULT NOTE ADULT - ASSESSMENT
72 year old female from home  with uncontrolled T2DM ,HbA1c - 12.1 72 year old female from home  with uncontrolled T2DM ,HbA1c - 12.1 presents with pancreatitis

## 2018-07-21 NOTE — CONSULT NOTE ADULT - PROBLEM SELECTOR PROBLEM 1
Diabetes Type 2 diabetes mellitus with hyperglycemia, with long-term current use of insulin Type 2 diabetes mellitus with hyperglycemia, without long-term current use of insulin

## 2018-07-21 NOTE — CONSULT NOTE ADULT - PROBLEM SELECTOR RECOMMENDATION 2
c/w current BP meds regimen -HCTZ , atenolol, lisinopril   BP goal < 130/85 c/w current BP meds regimen -HCTZ , atenolol, lisinopril   BP goal < 130/80

## 2018-07-21 NOTE — CONSULT NOTE ADULT - PROBLEM SELECTOR RECOMMENDATION 9
While inpatient - c/w Lantus QHS ....units plus units .....Humalog pre meals   c/w low dose corrective insulin scale at bedtime and before meals  started on diet just now  Goal HbA1c- 6.5-7.0, recent HbA1c - 12.1  Goal -180 while inpatient  Nutrition consult  Upon dc - pt will continue with basal + bolus regimen - TBD  Pt will follow up with our o/p location [address]([phone]) While inpatient - c/w Lantus QHS 17 units plus units 5 Humalog pre meals   c/w low dose corrective insulin scale at bedtime and before meals  started on diet just now  Goal HbA1c- 6.5-7.0, recent HbA1c - 12.1  Goal -180 while inpatient  Nutrition consult  Upon dc - pt will continue with basal + bolus regimen - TBD  Pt will follow up with our o/p location [address] [facility]([phone]) Will start basal bolus regimen of Lantus 17 units daily and Humalog 5 units QAC.  Encouraged carbohydrate consistent diet  Will follow closely

## 2018-07-21 NOTE — PROGRESS NOTE ADULT - SUBJECTIVE AND OBJECTIVE BOX
GENERAL SURGERY DAILY PROGRESS NOTE:     Subjective:    Pt seen and examined during morning rounds. Pt feeling better this am. Denies pain, denies n/v. Episode of htn overnight that resolved w/ hydralazine. Pt currently +/+. Placed on sliding scale.       Objective:    NAD, awake and alert  Respirations nonlabored  Abdomen soft, mild tenderness, nondistended  No guarding or rebound tenderness      MEDICATIONS  (STANDING):  aspirin 325 milliGRAM(s) Oral daily  ATENolol  Tablet 50 milliGRAM(s) Oral daily  dextrose 5% + sodium chloride 0.45% with potassium chloride 20 mEq/L 1000 milliLiter(s) (100 mL/Hr) IV Continuous <Continuous>  dextrose 5%. 1000 milliLiter(s) (50 mL/Hr) IV Continuous <Continuous>  dextrose 50% Injectable 12.5 Gram(s) IV Push once  dextrose 50% Injectable 25 Gram(s) IV Push once  dextrose 50% Injectable 25 Gram(s) IV Push once  heparin  Injectable 5000 Unit(s) SubCutaneous every 8 hours  hydrochlorothiazide 25 milliGRAM(s) Oral daily  insulin lispro (HumaLOG) corrective regimen sliding scale   SubCutaneous three times a day before meals  insulin lispro (HumaLOG) corrective regimen sliding scale   SubCutaneous at bedtime  lisinopril 40 milliGRAM(s) Oral daily    MEDICATIONS  (PRN):  dextrose 40% Gel 15 Gram(s) Oral once PRN Blood Glucose LESS THAN 70 milliGRAM(s)/deciliter  glucagon  Injectable 1 milliGRAM(s) IntraMuscular once PRN Glucose LESS THAN 70 milligrams/deciliter  HYDROmorphone  Injectable 0.5 milliGRAM(s) IV Push every 4 hours PRN Moderate Pain (4 - 6)  HYDROmorphone  Injectable 1 milliGRAM(s) IV Push every 4 hours PRN Severe Pain (7 - 10)  ondansetron Injectable 4 milliGRAM(s) IV Push every 6 hours PRN Nausea and/or Vomiting      Vital Signs Last 24 Hrs  T(C): 36.5 (21 Jul 2018 06:26), Max: 36.8 (20 Jul 2018 13:25)  T(F): 97.7 (21 Jul 2018 06:26), Max: 98.2 (20 Jul 2018 13:25)  HR: 55 (21 Jul 2018 06:26) (54 - 56)  BP: 124/70 (21 Jul 2018 06:26) (124/70 - 198/84)  BP(mean): --  RR: 18 (21 Jul 2018 06:26) (18 - 18)  SpO2: 100% (21 Jul 2018 06:26) (100% - 100%)    I&O's Detail    20 Jul 2018 07:01  -  21 Jul 2018 07:00  --------------------------------------------------------  IN:    dextrose 5% + sodium chloride 0.45% with potassium chloride 20 mEq/L: 800 mL  Total IN: 800 mL    OUT:    Voided: 1450 mL  Total OUT: 1450 mL    Total NET: -650 mL          Daily     Daily     LABS:                        12.8   6.89  )-----------( 206      ( 21 Jul 2018 06:36 )             38.9     07-21    135  |  99  |  8   ----------------------------<  254<H>  4.4   |  23  |  0.77    Ca    9.4      21 Jul 2018 06:36  Phos  2.2     07-21  Mg     1.8     07-21    TPro  7.6  /  Alb  3.7  /  TBili  0.5  /  DBili  x   /  AST  25  /  ALT  24  /  AlkPhos  78  07-21    PT/INR - ( 19 Jul 2018 15:42 )   PT: 12.0 SEC;   INR: 1.08          PTT - ( 19 Jul 2018 15:42 )  PTT:29.4 SEC

## 2018-07-22 LAB
-  AMIKACIN: SIGNIFICANT CHANGE UP
-  AMPICILLIN/SULBACTAM: SIGNIFICANT CHANGE UP
-  AMPICILLIN: SIGNIFICANT CHANGE UP
-  AZTREONAM: SIGNIFICANT CHANGE UP
-  CEFAZOLIN: SIGNIFICANT CHANGE UP
-  CEFEPIME: SIGNIFICANT CHANGE UP
-  CEFOXITIN: SIGNIFICANT CHANGE UP
-  CEFTAZIDIME: SIGNIFICANT CHANGE UP
-  CEFTRIAXONE: SIGNIFICANT CHANGE UP
-  CIPROFLOXACIN: SIGNIFICANT CHANGE UP
-  ERTAPENEM: SIGNIFICANT CHANGE UP
-  GENTAMICIN: SIGNIFICANT CHANGE UP
-  IMIPENEM: SIGNIFICANT CHANGE UP
-  LEVOFLOXACIN: SIGNIFICANT CHANGE UP
-  MEROPENEM: SIGNIFICANT CHANGE UP
-  NITROFURANTOIN: SIGNIFICANT CHANGE UP
-  PIPERACILLIN/TAZOBACTAM: SIGNIFICANT CHANGE UP
-  TIGECYCLINE: SIGNIFICANT CHANGE UP
-  TOBRAMYCIN: SIGNIFICANT CHANGE UP
-  TRIMETHOPRIM/SULFAMETHOXAZOLE: SIGNIFICANT CHANGE UP
BACTERIA UR CULT: SIGNIFICANT CHANGE UP
BUN SERPL-MCNC: 9 MG/DL — SIGNIFICANT CHANGE UP (ref 7–23)
CALCIUM SERPL-MCNC: 8.8 MG/DL — SIGNIFICANT CHANGE UP (ref 8.4–10.5)
CHLORIDE SERPL-SCNC: 103 MMOL/L — SIGNIFICANT CHANGE UP (ref 98–107)
CHOLEST SERPL-MCNC: 159 MG/DL — SIGNIFICANT CHANGE UP (ref 120–199)
CO2 SERPL-SCNC: 22 MMOL/L — SIGNIFICANT CHANGE UP (ref 22–31)
CREAT SERPL-MCNC: 0.89 MG/DL — SIGNIFICANT CHANGE UP (ref 0.5–1.3)
GLUCOSE BLDC GLUCOMTR-MCNC: 121 MG/DL — HIGH (ref 70–99)
GLUCOSE BLDC GLUCOMTR-MCNC: 140 MG/DL — HIGH (ref 70–99)
GLUCOSE BLDC GLUCOMTR-MCNC: 146 MG/DL — HIGH (ref 70–99)
GLUCOSE BLDC GLUCOMTR-MCNC: 242 MG/DL — HIGH (ref 70–99)
GLUCOSE SERPL-MCNC: 233 MG/DL — HIGH (ref 70–99)
HCT VFR BLD CALC: 35.8 % — SIGNIFICANT CHANGE UP (ref 34.5–45)
HDLC SERPL-MCNC: 41 MG/DL — LOW (ref 45–65)
HGB BLD-MCNC: 11.8 G/DL — SIGNIFICANT CHANGE UP (ref 11.5–15.5)
LIDOCAIN IGE QN: 143.2 U/L — HIGH (ref 7–60)
LIPID PNL WITH DIRECT LDL SERPL: 96 MG/DL — SIGNIFICANT CHANGE UP
MAGNESIUM SERPL-MCNC: 1.8 MG/DL — SIGNIFICANT CHANGE UP (ref 1.6–2.6)
MCHC RBC-ENTMCNC: 30 PG — SIGNIFICANT CHANGE UP (ref 27–34)
MCHC RBC-ENTMCNC: 33 % — SIGNIFICANT CHANGE UP (ref 32–36)
MCV RBC AUTO: 91.1 FL — SIGNIFICANT CHANGE UP (ref 80–100)
METHOD TYPE: SIGNIFICANT CHANGE UP
NRBC # FLD: 0 — SIGNIFICANT CHANGE UP
ORGANISM # SPEC MICROSCOPIC CNT: SIGNIFICANT CHANGE UP
ORGANISM # SPEC MICROSCOPIC CNT: SIGNIFICANT CHANGE UP
PHOSPHATE SERPL-MCNC: 2 MG/DL — LOW (ref 2.5–4.5)
PLATELET # BLD AUTO: 187 K/UL — SIGNIFICANT CHANGE UP (ref 150–400)
PMV BLD: 10.7 FL — SIGNIFICANT CHANGE UP (ref 7–13)
POTASSIUM SERPL-MCNC: 4.1 MMOL/L — SIGNIFICANT CHANGE UP (ref 3.5–5.3)
POTASSIUM SERPL-SCNC: 4.1 MMOL/L — SIGNIFICANT CHANGE UP (ref 3.5–5.3)
RBC # BLD: 3.93 M/UL — SIGNIFICANT CHANGE UP (ref 3.8–5.2)
RBC # FLD: 12.7 % — SIGNIFICANT CHANGE UP (ref 10.3–14.5)
SODIUM SERPL-SCNC: 139 MMOL/L — SIGNIFICANT CHANGE UP (ref 135–145)
TRIGL SERPL-MCNC: 154 MG/DL — HIGH (ref 10–149)
WBC # BLD: 5.88 K/UL — SIGNIFICANT CHANGE UP (ref 3.8–10.5)
WBC # FLD AUTO: 5.88 K/UL — SIGNIFICANT CHANGE UP (ref 3.8–10.5)

## 2018-07-22 PROCEDURE — 99232 SBSQ HOSP IP/OBS MODERATE 35: CPT | Mod: GC

## 2018-07-22 RX ORDER — SODIUM CHLORIDE 9 MG/ML
1000 INJECTION INTRAMUSCULAR; INTRAVENOUS; SUBCUTANEOUS
Qty: 0 | Refills: 0 | Status: DISCONTINUED | OUTPATIENT
Start: 2018-07-22 | End: 2018-07-22

## 2018-07-22 RX ORDER — CIPROFLOXACIN LACTATE 400MG/40ML
500 VIAL (ML) INTRAVENOUS EVERY 12 HOURS
Qty: 0 | Refills: 0 | Status: DISCONTINUED | OUTPATIENT
Start: 2018-07-22 | End: 2018-07-23

## 2018-07-22 RX ORDER — MAGNESIUM SULFATE 500 MG/ML
2 VIAL (ML) INJECTION ONCE
Qty: 0 | Refills: 0 | Status: COMPLETED | OUTPATIENT
Start: 2018-07-22 | End: 2018-07-22

## 2018-07-22 RX ORDER — ENOXAPARIN SODIUM 100 MG/ML
40 INJECTION SUBCUTANEOUS DAILY
Qty: 0 | Refills: 0 | Status: DISCONTINUED | OUTPATIENT
Start: 2018-07-22 | End: 2018-07-23

## 2018-07-22 RX ORDER — ACETAMINOPHEN 500 MG
1000 TABLET ORAL ONCE
Qty: 0 | Refills: 0 | Status: COMPLETED | OUTPATIENT
Start: 2018-07-22 | End: 2018-07-22

## 2018-07-22 RX ORDER — INSULIN GLARGINE 100 [IU]/ML
20 INJECTION, SOLUTION SUBCUTANEOUS AT BEDTIME
Qty: 0 | Refills: 0 | Status: DISCONTINUED | OUTPATIENT
Start: 2018-07-22 | End: 2018-07-23

## 2018-07-22 RX ADMIN — LISINOPRIL 40 MILLIGRAM(S): 2.5 TABLET ORAL at 06:24

## 2018-07-22 RX ADMIN — Medication 1000 MILLIGRAM(S): at 02:45

## 2018-07-22 RX ADMIN — Medication 63.75 MILLIMOLE(S): at 11:29

## 2018-07-22 RX ADMIN — SODIUM CHLORIDE 75 MILLILITER(S): 9 INJECTION INTRAMUSCULAR; INTRAVENOUS; SUBCUTANEOUS at 10:35

## 2018-07-22 RX ADMIN — Medication 5 UNIT(S): at 16:58

## 2018-07-22 RX ADMIN — INSULIN GLARGINE 20 UNIT(S): 100 INJECTION, SOLUTION SUBCUTANEOUS at 22:35

## 2018-07-22 RX ADMIN — Medication 400 MILLIGRAM(S): at 01:30

## 2018-07-22 RX ADMIN — Medication 5 UNIT(S): at 07:48

## 2018-07-22 RX ADMIN — HYDROMORPHONE HYDROCHLORIDE 1 MILLIGRAM(S): 2 INJECTION INTRAMUSCULAR; INTRAVENOUS; SUBCUTANEOUS at 23:33

## 2018-07-22 RX ADMIN — Medication 25 MILLIGRAM(S): at 06:24

## 2018-07-22 RX ADMIN — Medication 500 MILLIGRAM(S): at 19:01

## 2018-07-22 RX ADMIN — Medication 5 UNIT(S): at 11:39

## 2018-07-22 RX ADMIN — Medication 50 GRAM(S): at 11:29

## 2018-07-22 RX ADMIN — ATENOLOL 50 MILLIGRAM(S): 25 TABLET ORAL at 06:24

## 2018-07-22 RX ADMIN — HEPARIN SODIUM 5000 UNIT(S): 5000 INJECTION INTRAVENOUS; SUBCUTANEOUS at 06:24

## 2018-07-22 RX ADMIN — Medication 325 MILLIGRAM(S): at 10:35

## 2018-07-22 RX ADMIN — Medication 2: at 07:46

## 2018-07-22 NOTE — PROGRESS NOTE ADULT - ASSESSMENT
Wendie Hobbs is a 72 y.o. woman with history of DM, CAD s/p stents (2003), HLD, and HTN who presented to the ED with 2 weeks of increasing epigastric and LUQ abdominal pain found to have acute pancreatitis. Pt w/ improved pain and HDS, tolerating diet.      Plan:   - reg diet  - IVL  - Pain management  - Serial abdominal exams  - f/u endo for d/c consults. appreciate recs.  - anticipate d/c today or tomorrow    [facility]  [id number]

## 2018-07-22 NOTE — PROGRESS NOTE ADULT - SUBJECTIVE AND OBJECTIVE BOX
[facility] Progress Note     SUBJECTIVE / 24H EVENTS  Patient seen and examined on morning rounds. No acute events overnight. Pain well controlled. Tolerating diet. +flatus. -BM. no nausea / vomiting.     OBJECTIVE:    VITAL SIGNS:  Vital Signs Last 24 Hrs  T(C): 36.7 (22 Jul 2018 09:13), Max: 36.8 (21 Jul 2018 21:23)  T(F): 98.1 (22 Jul 2018 09:13), Max: 98.3 (22 Jul 2018 06:20)  HR: 54 (22 Jul 2018 09:13) (50 - 59)  BP: 136/62 (22 Jul 2018 09:13) (136/62 - 156/66)  BP(mean): --  RR: 18 (22 Jul 2018 09:13) (18 - 19)  SpO2: 100% (22 Jul 2018 06:20) (100% - 100%)  CAPILLARY BLOOD GLUCOSE      POCT Blood Glucose.: 242 mg/dL (22 Jul 2018 07:40)  POCT Blood Glucose.: 171 mg/dL (21 Jul 2018 22:13)  POCT Blood Glucose.: 148 mg/dL (21 Jul 2018 16:50)  POCT Blood Glucose.: 264 mg/dL (21 Jul 2018 11:35)      PHYSICAL EXAM:  Gen: NAD  LS: Respirations unlabored.   GI: Soft. Nontender. Mildly distended.      I&O's Detail    21 Jul 2018 07:01  -  22 Jul 2018 07:00  --------------------------------------------------------  IN:  Total IN: 0 mL    OUT:    Voided: 1300 mL  Total OUT: 1300 mL    Total NET: -1300 mL          LAB VALUES:  07-22    139  |  103  |  9   ----------------------------<  233<H>  4.1   |  22  |  0.89    Ca    8.8      22 Jul 2018 06:55  Phos  2.0     07-22  Mg     1.8     07-22    TPro  7.6  /  Alb  3.7  /  TBili  0.5  /  DBili  x   /  AST  25  /  ALT  24  /  AlkPhos  78  07-21                          11.8   5.88  )-----------( 187      ( 22 Jul 2018 06:55 )             35.8     LIVER FUNCTIONS - ( 21 Jul 2018 06:36 )  Alb: 3.7 g/dL / Pro: 7.6 g/dL / ALK PHOS: 78 u/L / ALT: 24 u/L / AST: 25 u/L / GGT: x                 MICROBIOLOGY:    No new microbiology data for review.     RADIOLOGY:    No new radiographic images for review.    MEDICATIONS  (STANDING):  aspirin 325 milliGRAM(s) Oral daily  ATENolol  Tablet 50 milliGRAM(s) Oral daily  dextrose 5%. 1000 milliLiter(s) (50 mL/Hr) IV Continuous <Continuous>  dextrose 50% Injectable 12.5 Gram(s) IV Push once  dextrose 50% Injectable 25 Gram(s) IV Push once  dextrose 50% Injectable 25 Gram(s) IV Push once  enoxaparin Injectable 40 milliGRAM(s) SubCutaneous daily  hydrochlorothiazide 25 milliGRAM(s) Oral daily  insulin glargine Injectable (LANTUS) 20 Unit(s) SubCutaneous at bedtime  insulin lispro (HumaLOG) corrective regimen sliding scale   SubCutaneous three times a day before meals  insulin lispro (HumaLOG) corrective regimen sliding scale   SubCutaneous at bedtime  insulin lispro Injectable (HumaLOG) 5 Unit(s) SubCutaneous three times a day before meals  lisinopril 40 milliGRAM(s) Oral daily    MEDICATIONS  (PRN):  dextrose 40% Gel 15 Gram(s) Oral once PRN Blood Glucose LESS THAN 70 milliGRAM(s)/deciliter  glucagon  Injectable 1 milliGRAM(s) IntraMuscular once PRN Glucose LESS THAN 70 milligrams/deciliter  HYDROmorphone  Injectable 0.5 milliGRAM(s) IV Push every 4 hours PRN Moderate Pain (4 - 6)  HYDROmorphone  Injectable 1 milliGRAM(s) IV Push every 4 hours PRN Severe Pain (7 - 10)  ondansetron Injectable 4 milliGRAM(s) IV Push every 6 hours PRN Nausea and/or Vomiting

## 2018-07-22 NOTE — PROGRESS NOTE ADULT - PROBLEM SELECTOR PLAN 1
While inpatient - will continue basal bolus regimen of Lantus 17 units daily and Humalog 5 units QAC.  pt's diet was advanced yesterday, tolerating  Pt was on D5W+ NS@ 150cc/hr which can be reason for suboptimal FS , now diet is advanced so changed fluids to NS @ 75 cc/hr  upon dc - will determine outpatient regimen. patient is not a candidate for DDp-IV inhibitors and GLP-1 analog because of pancreatitis and patient is intolerant to Metformin  Pt is still thinking about 4 times insulin injection upon discharge, she was explained risks and benefits of insulin injections given her HbA1c number of 12.1 While inpatient - will increase Lantus to 20 units daily and Humalog 5 units QAC.  pt's diet was advanced yesterday, tolerating  Pt was on D5W+ NS@ 150cc/hr which can be reason for suboptimal FS , now diet is advanced so changed fluids to NS @ 75 cc/hr  upon dc - will determine outpatient regimen. patient is not a candidate for DDp-IV inhibitors and GLP-1 analog because of pancreatitis and patient is intolerant to Metformin  Pt is still thinking about 4 times insulin injection upon discharge, she was explained risks and benefits of insulin injections given her HbA1c number of 12.1

## 2018-07-22 NOTE — PROGRESS NOTE ADULT - PROBLEM SELECTOR PLAN 2
c/w current BP meds regimen -HCTZ , atenolol, lisinopril   BP goal < 130/80.  Lipid profile - LDL - 96  Ch - 159, TG - 154

## 2018-07-22 NOTE — PROGRESS NOTE ADULT - SUBJECTIVE AND OBJECTIVE BOX
Chief Complaint/Follow-up on:   Uncontrolled T2DM    Subjective:  Pt was seen and examined at bedside, Pt is still thinking about 4 times insulin injection upon discharge, she was explained risks and benefits of insulin injections given her HbA1c number of 12.1 She get side effects from metformin not willing to start that back.   Pt's FS numbers are still elevated-noted below  Pt started eating low carb diet yesterday evening, still has abd soreness, no N/V/D.     MEDICATIONS  (STANDING):  aspirin 325 milliGRAM(s) Oral daily  ATENolol  Tablet 50 milliGRAM(s) Oral daily  dextrose 5%. 1000 milliLiter(s) (50 mL/Hr) IV Continuous <Continuous>  dextrose 50% Injectable 12.5 Gram(s) IV Push once  dextrose 50% Injectable 25 Gram(s) IV Push once  dextrose 50% Injectable 25 Gram(s) IV Push once  enoxaparin Injectable 40 milliGRAM(s) SubCutaneous daily  hydrochlorothiazide 25 milliGRAM(s) Oral daily  insulin glargine Injectable (LANTUS) 20 Unit(s) SubCutaneous at bedtime  insulin lispro (HumaLOG) corrective regimen sliding scale   SubCutaneous three times a day before meals  insulin lispro (HumaLOG) corrective regimen sliding scale   SubCutaneous at bedtime  insulin lispro Injectable (HumaLOG) 5 Unit(s) SubCutaneous three times a day before meals  lisinopril 40 milliGRAM(s) Oral daily    MEDICATIONS  (PRN):  dextrose 40% Gel 15 Gram(s) Oral once PRN Blood Glucose LESS THAN 70 milliGRAM(s)/deciliter  glucagon  Injectable 1 milliGRAM(s) IntraMuscular once PRN Glucose LESS THAN 70 milligrams/deciliter  HYDROmorphone  Injectable 0.5 milliGRAM(s) IV Push every 4 hours PRN Moderate Pain (4 - 6)  HYDROmorphone  Injectable 1 milliGRAM(s) IV Push every 4 hours PRN Severe Pain (7 - 10)  ondansetron Injectable 4 milliGRAM(s) IV Push every 6 hours PRN Nausea and/or Vomiting    Review of Systems:  Constitutional: No fever, good appetite/po intake  Eyes: No blurry vision, diplopia  Neuro: No tremors  HEENT: No pain  Cardiovascular: No chest pain, palpitations  Respiratory: No SOB, no cough  GI: No nausea, vomiting,   : No dysuria, hematuria  Skin: no rash  Psych: no depression  Endocrine: no polyuria, polydipsia  Hem/lymph: no swelling  Osteoporosis: no fractures        PHYSICAL EXAM:  VITALS: T(C): 36.7 (07-22-18 @ 09:13)  T(F): 98.1 (07-22-18 @ 09:13), Max: 98.3 (07-22-18 @ 06:20)  HR: 54 (07-22-18 @ 09:13) (50 - 59)  BP: 136/62 (07-22-18 @ 09:13) (136/62 - 156/66)  RR:  (18 - 19)  SpO2:  (100% - 100%)  Wt(kg): --  GENERAL: NAD, well-groomed, well-developed  EYES: No proptosis, no injection  HEENT:  Atraumatic, Normocephalic, moist mucous membranes  THYROID: Normal size, no palpable nodules  RESPIRATORY: Clear to auscultation bilaterally; No rales, rhonchi, wheezing, or rubs  CARDIOVASCULAR: Regular rate and rhythm; No murmurs; no peripheral edema  GI: Soft, nontender, non distended, normal bowel sounds  CUSHING'S SIGNS: no striae    POCT Blood Glucose.: 146 mg/dL (07-22-18 @ 11:36)  POCT Blood Glucose.: 242 mg/dL (07-22-18 @ 07:40)  POCT Blood Glucose.: 171 mg/dL (07-21-18 @ 22:13)  POCT Blood Glucose.: 148 mg/dL (07-21-18 @ 16:50)  POCT Blood Glucose.: 264 mg/dL (07-21-18 @ 11:35)  POCT Blood Glucose.: 223 mg/dL (07-21-18 @ 07:41)  POCT Blood Glucose.: 231 mg/dL (07-20-18 @ 21:47)  POCT Blood Glucose.: 104 mg/dL (07-20-18 @ 16:56)  POCT Blood Glucose.: 131 mg/dL (07-20-18 @ 11:43)  POCT Blood Glucose.: 181 mg/dL (07-19-18 @ 13:27)    07-22    139  |  103  |  9   ----------------------------<  233<H>  4.1   |  22  |  0.89    EGFR if : 76  EGFR if non : 65    Ca    8.8      07-22  Mg     1.8     07-22  Phos  2.0     07-22    TPro  7.6  /  Alb  3.7  /  TBili  0.5  /  DBili  x   /  AST  25  /  ALT  24  /  AlkPhos  78  07-21          Thyroid Function Tests:      Hemoglobin A1C, Whole Blood: 12.1 % <H> [4.0 - 5.6] (07-21-18 @ 06:36) Chief Complaint/Follow-up on:   Uncontrolled T2DM    Subjective:  Pt was seen and examined at bedside, Pt is still thinking about 4 times insulin injection upon discharge, she was explained risks and benefits of insulin injections given her HbA1c number of 12.1 She get side effects from metformin not willing to start that back.   Pt's FS numbers are still elevated-noted below  Pt started eating low carb diet yesterday evening, still has abd soreness, no N/V/D.     MEDICATIONS  (STANDING):  aspirin 325 milliGRAM(s) Oral daily  ATENolol  Tablet 50 milliGRAM(s) Oral daily  dextrose 5%. 1000 milliLiter(s) (50 mL/Hr) IV Continuous <Continuous>  dextrose 50% Injectable 12.5 Gram(s) IV Push once  dextrose 50% Injectable 25 Gram(s) IV Push once  dextrose 50% Injectable 25 Gram(s) IV Push once  enoxaparin Injectable 40 milliGRAM(s) SubCutaneous daily  hydrochlorothiazide 25 milliGRAM(s) Oral daily  insulin glargine Injectable (LANTUS) 20 Unit(s) SubCutaneous at bedtime  insulin lispro (HumaLOG) corrective regimen sliding scale   SubCutaneous three times a day before meals  insulin lispro (HumaLOG) corrective regimen sliding scale   SubCutaneous at bedtime  insulin lispro Injectable (HumaLOG) 5 Unit(s) SubCutaneous three times a day before meals  lisinopril 40 milliGRAM(s) Oral daily    MEDICATIONS  (PRN):  dextrose 40% Gel 15 Gram(s) Oral once PRN Blood Glucose LESS THAN 70 milliGRAM(s)/deciliter  glucagon  Injectable 1 milliGRAM(s) IntraMuscular once PRN Glucose LESS THAN 70 milligrams/deciliter  HYDROmorphone  Injectable 0.5 milliGRAM(s) IV Push every 4 hours PRN Moderate Pain (4 - 6)  HYDROmorphone  Injectable 1 milliGRAM(s) IV Push every 4 hours PRN Severe Pain (7 - 10)  ondansetron Injectable 4 milliGRAM(s) IV Push every 6 hours PRN Nausea and/or Vomiting    Review of Systems:  Constitutional: No fever, good appetite/po intake  Eyes: No blurry vision, diplopia  Neuro: No tremors  HEENT: No pain  Cardiovascular: No chest pain, palpitations  Respiratory: No SOB, no cough  GI: No nausea, vomiting,   : No dysuria, hematuria  Skin: no rash  Psych: no depression  Endocrine: no polyuria, polydipsia  Hem/lymph: no swelling        PHYSICAL EXAM:  VITALS: T(C): 36.7 (07-22-18 @ 09:13)  T(F): 98.1 (07-22-18 @ 09:13), Max: 98.3 (07-22-18 @ 06:20)  HR: 54 (07-22-18 @ 09:13) (50 - 59)  BP: 136/62 (07-22-18 @ 09:13) (136/62 - 156/66)  RR:  (18 - 19)  SpO2:  (100% - 100%)  Wt(kg): --  GENERAL: NAD, well-groomed, well-developed  EYES: No proptosis, no injection  HEENT:  Atraumatic, Normocephalic, moist mucous membranes  THYROID: Normal size, no palpable nodules  RESPIRATORY: Clear to auscultation bilaterally; No rales, rhonchi, wheezing, or rubs  CARDIOVASCULAR: Regular rate and rhythm; No murmurs; no peripheral edema  GI: Soft, nontender, non distended, normal bowel sounds  CUSHING'S SIGNS: no striae    POCT Blood Glucose.: 146 mg/dL (07-22-18 @ 11:36)  POCT Blood Glucose.: 242 mg/dL (07-22-18 @ 07:40)  POCT Blood Glucose.: 171 mg/dL (07-21-18 @ 22:13)  POCT Blood Glucose.: 148 mg/dL (07-21-18 @ 16:50)  POCT Blood Glucose.: 264 mg/dL (07-21-18 @ 11:35)  POCT Blood Glucose.: 223 mg/dL (07-21-18 @ 07:41)  POCT Blood Glucose.: 231 mg/dL (07-20-18 @ 21:47)  POCT Blood Glucose.: 104 mg/dL (07-20-18 @ 16:56)  POCT Blood Glucose.: 131 mg/dL (07-20-18 @ 11:43)  POCT Blood Glucose.: 181 mg/dL (07-19-18 @ 13:27)    07-22    139  |  103  |  9   ----------------------------<  233<H>  4.1   |  22  |  0.89    EGFR if : 76  EGFR if non : 65    Ca    8.8      07-22  Mg     1.8     07-22  Phos  2.0     07-22    TPro  7.6  /  Alb  3.7  /  TBili  0.5  /  DBili  x   /  AST  25  /  ALT  24  /  AlkPhos  78  07-21          Thyroid Function Tests:      Hemoglobin A1C, Whole Blood: 12.1 % <H> [4.0 - 5.6] (07-21-18 @ 06:36)

## 2018-07-23 ENCOUNTER — TRANSCRIPTION ENCOUNTER (OUTPATIENT)
Age: 73
End: 2018-07-23

## 2018-07-23 VITALS
TEMPERATURE: 98 F | OXYGEN SATURATION: 99 % | RESPIRATION RATE: 18 BRPM | HEART RATE: 56 BPM | DIASTOLIC BLOOD PRESSURE: 63 MMHG | SYSTOLIC BLOOD PRESSURE: 135 MMHG

## 2018-07-23 PROBLEM — Z00.00 ENCOUNTER FOR PREVENTIVE HEALTH EXAMINATION: Status: ACTIVE | Noted: 2018-07-23

## 2018-07-23 LAB
GLUCOSE BLDC GLUCOMTR-MCNC: 155 MG/DL — HIGH (ref 70–99)
GLUCOSE BLDC GLUCOMTR-MCNC: 183 MG/DL — HIGH (ref 70–99)
GLUCOSE BLDC GLUCOMTR-MCNC: 204 MG/DL — HIGH (ref 70–99)

## 2018-07-23 PROCEDURE — 99232 SBSQ HOSP IP/OBS MODERATE 35: CPT

## 2018-07-23 RX ORDER — ACETAMINOPHEN 500 MG
650 TABLET ORAL EVERY 6 HOURS
Qty: 0 | Refills: 0 | Status: DISCONTINUED | OUTPATIENT
Start: 2018-07-23 | End: 2018-07-23

## 2018-07-23 RX ORDER — REPAGLINIDE 1 MG/1
1 TABLET ORAL
Qty: 90 | Refills: 1 | OUTPATIENT
Start: 2018-07-23 | End: 2018-09-20

## 2018-07-23 RX ORDER — ENOXAPARIN SODIUM 100 MG/ML
20 INJECTION SUBCUTANEOUS
Qty: 5 | Refills: 1 | OUTPATIENT
Start: 2018-07-23 | End: 2018-09-20

## 2018-07-23 RX ORDER — CIPROFLOXACIN LACTATE 400MG/40ML
1 VIAL (ML) INTRAVENOUS
Qty: 6 | Refills: 0 | OUTPATIENT
Start: 2018-07-23 | End: 2018-07-25

## 2018-07-23 RX ORDER — ACETAMINOPHEN 500 MG
2 TABLET ORAL
Qty: 0 | Refills: 0 | COMMUNITY
Start: 2018-07-23

## 2018-07-23 RX ADMIN — Medication 5 UNIT(S): at 17:01

## 2018-07-23 RX ADMIN — Medication 5 UNIT(S): at 11:44

## 2018-07-23 RX ADMIN — LISINOPRIL 40 MILLIGRAM(S): 2.5 TABLET ORAL at 05:49

## 2018-07-23 RX ADMIN — Medication 5 UNIT(S): at 07:48

## 2018-07-23 RX ADMIN — Medication 2: at 11:44

## 2018-07-23 RX ADMIN — ATENOLOL 50 MILLIGRAM(S): 25 TABLET ORAL at 05:50

## 2018-07-23 RX ADMIN — Medication 500 MILLIGRAM(S): at 05:49

## 2018-07-23 RX ADMIN — Medication 25 MILLIGRAM(S): at 05:49

## 2018-07-23 RX ADMIN — Medication 325 MILLIGRAM(S): at 11:51

## 2018-07-23 RX ADMIN — Medication 1: at 07:48

## 2018-07-23 RX ADMIN — Medication 1: at 17:01

## 2018-07-23 RX ADMIN — Medication 500 MILLIGRAM(S): at 17:00

## 2018-07-23 NOTE — PROGRESS NOTE ADULT - ATTENDING COMMENTS
Above noted.   Plan: Home today. Follow-up with Dr. Lopez (PCP) and Endocrinology as needed.
72 y.o. female with h/o uncontrolled Type 2 DM and HTN presents with pancreatitis  1. Type 2 DM- Given fasting hyperglycemia, will increase Lantus to 20 units daily. P.O. intake still minimal so will continue current regimen of Humalog 5 units QAC. Discussed outpatient plan and may consider basal insulin with Repaglinide QAC. Not a candidate for GLP-1 analog and DDP-IV inhibitor given pancreatitis and intolerant to Metformin. Recommend diabetic teaching.  2. HTN- Will continue medications    Will follow

## 2018-07-23 NOTE — ADVANCED PRACTICE NURSE CONSULT - REASON FOR CONSULT
72 y.o. woman with history of DM, CAD s/p stents (2003), HLD, and HTN who presented to the ED with 2 weeks of increasing epigastric and LUQ abdominal pain. The patient states that her pain began after she was started on a new medication, Metformin, by her primary care physician. The patient states that he pain slowly increased and she started to have associated abdominal distention and diarrhea. The patient also states that she began feeling SOB when walking long distances. The patient returned to her primary care physician 1 week ago and her dose of Metformin was decreased, which helped with her pain and other symptoms. However, she was finally prompted to come in the  persistence of her symptoms. The patient denies any associated fever, nausea, vomiting, or diaphoresis. (19 Jul 2018 21:41)    Endocrine Hx  72 year old female from home  with Hx T2DM , 12.1 hbA1c,seen by endocrine service for hyperglycemia and T2DM. She has DM for years initially on metformin 500 mg dose ,recently her dose was increased from 500mg to 1000 BID and she suffered from side effects ( N/V/D) and for last few days she is not taking metformin , last dose last thursday. She is not following any outpatient Endocrinologist. She does not follow low carb diet and does not do exercise She eats regular food , loves potato and rice, pasta. Pt never checks her FS.   Pt currently has any hyperglycemia sx like + visual blurriness, polyuria and polydipsia, constipation. She has no known microvascular cx, + CAD, no CVA/PAD.   No recent hospitalizations for DKA/HONK or hypoglycemia. Pt was referred for diabetes education as pt is new to insulin and A1C of 12.2%.

## 2018-07-23 NOTE — DISCHARGE NOTE ADULT - PLAN OF CARE
Resolution of symptoms You were admitted to [facility] for acute pancreatitis.  When you left the hospital you had decreased pain and were able to tolerate a regular diet.  If you have any abdominal pain, fever, chills, nausea or vomiting, please call Dr. Peters's office.  Please call Surgeon, Dr. Peters's office to make an appointment in 1 week. You may slowly resume your pre-hospital physical activity as long as it doesn't cause you pain or dizziness. - New Regimen:   - Continue carbohydrate consistent diet  - Follow-up with the endocrinologist (Dr. Feliciano) with 2 weeks following discharge, call to schedule an appointment. - Hypoglycemia Management : Please check your fingersticks every morning or if you are not feeling well.  If your fingerstick is >300 mg/dl x 3 or more readings, please contact (provider) _Dr. Feliciano___. If your fingerstick low, <70 mg/dl and/or you have symptoms of very low blood sugar, FIRST drink 1/2 cup of apple juice, (or take 4 glucose tabs/tube of glucose gel) and recheck fingerstick in 15 minutes.  Repeat these steps until blood sugar is above 100 mg/dl, IF NECESSARY.  Then call provider listed above to discuss low blood sugar at (phone number)___[phone]____.    -What to expect at follow appointment:  Please bring a log of your fingersticks and/or your glucometer to your appointment.  Your blood sugar tracking will help your diabetes team determine the best plan.    - New Regimen: Lantus Solostar PEN 20 units at night  ; Prandin 1 mg 3x/day before meals

## 2018-07-23 NOTE — PROGRESS NOTE ADULT - SUBJECTIVE AND OBJECTIVE BOX
Chief Complaint/Follow-up on:   Uncontrolled T2DM    Subjective:  Pt was seen and examined at bedside, patiwent does not want to do 4 injections per day.  pt reports at home she usually eats hard boiled eggs or farina/grits for breakfast, often does nto eat lunch, and eats dinner around 7pm whch includes carbs, veg and protein. Does not like hospital food.  \    MEDICATIONS  (STANDING):  aspirin 325 milliGRAM(s) Oral daily  ATENolol  Tablet 50 milliGRAM(s) Oral daily  dextrose 5%. 1000 milliLiter(s) (50 mL/Hr) IV Continuous <Continuous>  dextrose 50% Injectable 12.5 Gram(s) IV Push once  dextrose 50% Injectable 25 Gram(s) IV Push once  dextrose 50% Injectable 25 Gram(s) IV Push once  enoxaparin Injectable 40 milliGRAM(s) SubCutaneous daily  hydrochlorothiazide 25 milliGRAM(s) Oral daily  insulin glargine Injectable (LANTUS) 20 Unit(s) SubCutaneous at bedtime  insulin lispro (HumaLOG) corrective regimen sliding scale   SubCutaneous three times a day before meals  insulin lispro (HumaLOG) corrective regimen sliding scale   SubCutaneous at bedtime  insulin lispro Injectable (HumaLOG) 5 Unit(s) SubCutaneous three times a day before meals  lisinopril 40 milliGRAM(s) Oral daily    MEDICATIONS  (PRN):  dextrose 40% Gel 15 Gram(s) Oral once PRN Blood Glucose LESS THAN 70 milliGRAM(s)/deciliter  glucagon  Injectable 1 milliGRAM(s) IntraMuscular once PRN Glucose LESS THAN 70 milligrams/deciliter  HYDROmorphone  Injectable 0.5 milliGRAM(s) IV Push every 4 hours PRN Moderate Pain (4 - 6)  HYDROmorphone  Injectable 1 milliGRAM(s) IV Push every 4 hours PRN Severe Pain (7 - 10)  ondansetron Injectable 4 milliGRAM(s) IV Push every 6 hours PRN Nausea and/or Vomiting    Review of Systems:  Constitutional: No fever, good appetite/po intake  Eyes: No blurry vision, diplopia          PHYSICAL EXAM:  Vital Signs Last 24 Hrs  T(C): 36.6 (23 Jul 2018 05:45), Max: 36.8 (22 Jul 2018 17:26)  T(F): 97.9 (23 Jul 2018 05:45), Max: 98.2 (22 Jul 2018 17:26)  HR: 54 (23 Jul 2018 05:45) (54 - 62)  BP: 122/59 (23 Jul 2018 05:45) (122/59 - 141/69)  BP(mean): --  RR: 16 (23 Jul 2018 05:45) (16 - 18)  SpO2: 100% (23 Jul 2018 05:45) (100% - 100%)  GENERAL: NAD,  well-developed  RESPIRATORY: Clear to auscultation bilaterally; No rales, rhonchi, wheezing, or rubs  CARDIOVASCULAR: Regular rate and rhythm; No murmurs; no peripheral edema  GI: Soft, nontender, non distended, normal bowel sounds      CAPILLARY BLOOD GLUCOSE  POCT Blood Glucose.: 204 mg/dL (23 Jul 2018 11:39)  POCT Blood Glucose.: 183 mg/dL (23 Jul 2018 07:38)  POCT Blood Glucose.: 121 mg/dL (22 Jul 2018 21:38)  POCT Blood Glucose.: 140 mg/dL (22 Jul 2018 16:50)    POCT Blood Glucose.: 146 mg/dL (07-22-18 @ 11:36)  POCT Blood Glucose.: 242 mg/dL (07-22-18 @ 07:40)  POCT Blood Glucose.: 171 mg/dL (07-21-18 @ 22:13)  POCT Blood Glucose.: 148 mg/dL (07-21-18 @ 16:50)  POCT Blood Glucose.: 264 mg/dL (07-21-18 @ 11:35)  POCT Blood Glucose.: 223 mg/dL (07-21-18 @ 07:41)  POCT Blood Glucose.: 231 mg/dL (07-20-18 @ 21:47)  POCT Blood Glucose.: 104 mg/dL (07-20-18 @ 16:56)  POCT Blood Glucose.: 131 mg/dL (07-20-18 @ 11:43)  POCT Blood Glucose.: 181 mg/dL (07-19-18 @ 13:27)    07-22    139  |  103  |  9   ----------------------------<  233<H>  4.1   |  22  |  0.89    EGFR if : 76  EGFR if non : 65    Ca    8.8      07-22  Mg     1.8     07-22  Phos  2.0     07-22    TPro  7.6  /  Alb  3.7  /  TBili  0.5  /  DBili  x   /  AST  25  /  ALT  24  /  AlkPhos  78  07-21        Hemoglobin A1C, Whole Blood: 12.1 % <H> [4.0 - 5.6] (07-21-18 @ 06:36)

## 2018-07-23 NOTE — ADVANCED PRACTICE NURSE CONSULT - RECOMMEDATIONS
Endocrine team set up a follow up appointment for the pt. Pt to be discharged today. RN to reinforce diabetes teaching.

## 2018-07-23 NOTE — DISCHARGE NOTE ADULT - CARE PLAN
Principal Discharge DX:	Pancreatitis  Goal:	Resolution of symptoms  Assessment and plan of treatment:	You were admitted to [facility] for acute pancreatitis.  When you left the hospital you had decreased pain and were able to tolerate a regular diet.  If you have any abdominal pain, fever, chills, nausea or vomiting, please call Dr. Peters's office.  Please call Surgeon, Dr. Peters's office to make an appointment in 1 week. You may slowly resume your pre-hospital physical activity as long as it doesn't cause you pain or dizziness.  Secondary Diagnosis:	Diabetes  Assessment and plan of treatment:	- New Regimen:   - Continue carbohydrate consistent diet  - Follow-up with the endocrinologist (Dr. Feliciano) with 2 weeks following discharge, call to schedule an appointment. Principal Discharge DX:	Pancreatitis  Goal:	Resolution of symptoms  Assessment and plan of treatment:	You were admitted to [facility] for acute pancreatitis.  When you left the hospital you had decreased pain and were able to tolerate a regular diet.  If you have any abdominal pain, fever, chills, nausea or vomiting, please call Dr. Peters's office.  Please call Surgeon, Dr. Peters's office to make an appointment in 1 week. You may slowly resume your pre-hospital physical activity as long as it doesn't cause you pain or dizziness.  Secondary Diagnosis:	Diabetes  Assessment and plan of treatment:	- Hypoglycemia Management : Please check your fingersticks every morning or if you are not feeling well.  If your fingerstick is >300 mg/dl x 3 or more readings, please contact (provider) _Dr. Feliciano___. If your fingerstick low, <70 mg/dl and/or you have symptoms of very low blood sugar, FIRST drink 1/2 cup of apple juice, (or take 4 glucose tabs/tube of glucose gel) and recheck fingerstick in 15 minutes.  Repeat these steps until blood sugar is above 100 mg/dl, IF NECESSARY.  Then call provider listed above to discuss low blood sugar at (phone number)___[phone]____.    -What to expect at follow appointment:  Please bring a log of your fingersticks and/or your glucometer to your appointment.  Your blood sugar tracking will help your diabetes team determine the best plan.    - New Regimen: Lantus Solostar PEN 20 units at night  ; Prandin 1 mg 3x/day before meals

## 2018-07-23 NOTE — DISCHARGE NOTE ADULT - PATIENT PORTAL LINK FT
You can access the [facility] Patient Portal, offered by [facility], by registering with the following website: [email or web address]

## 2018-07-23 NOTE — DISCHARGE NOTE ADULT - MEDICATION SUMMARY - MEDICATIONS TO TAKE
I will START or STAY ON the medications listed below when I get home from the hospital:    Alcohol Swab  -- Dispense 3 boxes  -- Indication: For Diabetes supply    BD Danita needle  -- Dispense 3 boxes  -- Indication: For Diabetes supply    Glucometer  -- Check blood glucose levels at least 4x/ day.    Dispense one glucometer  -- Indication: For Diabetes supply    Glucose Test Strips  -- application compounding 4 times a day Please test your blood glucose before meals and at bedtime (4x per day).    Dispense 3 boxes  -- Indication: For Diabetes Supply    Lancets  -- Check fingersticks before meals and at bedtime.     Dispense 3 boxes    -- Indication: For Diabetes Supply    acetaminophen 325 mg oral tablet  -- 2 tab(s) by mouth every 6 hours, As needed, Mild - moderate pain  -- Indication: For Pain    aspirin 325 mg oral tablet  -- 1 tab(s) by mouth once a day  -- Indication: For Prevent ischemia    ramipril 10 mg oral capsule  -- 1 cap(s) by mouth once a day  -- Indication: For blood pressure    Lantus Solostar Pen 100 units/mL subcutaneous solution  -- 20 unit(s) subcutaneous once a day (at bedtime)   -- Do not drink alcoholic beverages when taking this medication.  It is very important that you take or use this exactly as directed.  Do not skip doses or discontinue unless directed by your doctor.  Keep in refrigerator.  Do not freeze.    -- Indication: For Diabetes    Prandin 1 mg oral tablet  -- 1 tab(s) by mouth 3 times a day before meals  -- Do not drink alcoholic beverages when taking this medication.  It is very important that you take or use this exactly as directed.  Do not skip doses or discontinue unless directed by your doctor.  Obtain medical advice before taking any non-prescription drugs as some may affect the action of this medication.    -- Indication: For Diabetes    simvastatin 40 mg oral tablet  -- 1 tab(s) by mouth once a day (at bedtime)  -- Indication: For High cholesterol    atenolol 50 mg oral tablet  -- 1 tab(s) by mouth once a day  -- Indication: For High blood pressure    emollients, topical cream  -- 1 application on skin 2 times a day  -- Indication: For skin    emollients, topical lotion  -- 1 application on skin 2 times a day, As needed, Dry Skin  -- Indication: For skin    hydrochlorothiazide 25 mg oral tablet  -- 1 tab(s) by mouth once a day  -- Indication: For blood pressure    Melatonin Time Release  -- 2 milligram(s) by mouth once a day (in the evening)  -- Indication: For sleep aid    Prilosec 20 mg oral delayed release tablet  -- 1 tab(s) by mouth once a day  -- Indication: For Reflux    ciprofloxacin 500 mg oral tablet  -- 1 tab(s) by mouth every 12 hours  -- Indication: For Antibiotic- UTI

## 2018-07-23 NOTE — DISCHARGE NOTE ADULT - ADDITIONAL INSTRUCTIONS
- Please call surgeon, Dr. Peters's office to make a follow-up appointment in 1 week, call [phone] to schedule.    - Follow-up with the endocrinologist (Dr. Feliciano) with 2 weeks following discharge, call [phone] to schedule an appointment. - Please call surgeon, Dr. Peters's office to make a follow-up appointment in 1 week, call [phone] to schedule.    - Follow up with Dr. Diamond Gallardo 9endocrinology) on 9/13/18 at 2pm.  [address] [place]  [phone]  ***************You will need a referral from Primary provider as required by HIP medicare insurance************** - Please call surgeon, Dr. Peters's office to make a follow-up appointment in 1 week, call [phone] to schedule.    - Follow up with Dr. Diamond Gallardo (endocrinology) on 9/13/18 at 2pm.  [address] [place]  [phone]  ***************You will need a referral from Primary provider as required by HIP medicare insurance**************

## 2018-07-23 NOTE — ADVANCED PRACTICE NURSE CONSULT - ASSESSMENT
Pt states she has had a hx of type 2 diabetes for 6 years. Diet reviewed Pt eats a light breakfast at times, skips lunch and has a larger portion for dinner. Pt does also admit to not eating as much as she has in the past. Pt does not read food labels and has never measured portions of foods. Pt also does not check her blood sugar. Pt does admit to walking or riding a bike for about 1/2 hr. Pt never checked her blood sugar but does admit to experiencing symptoms of hypoglycemia. Pt was instructed to have a snack in the middel of the day especially if she is going to be more active. Pt insturcted on diabetes self management skills including, BGM, how to use and insulin pen, location of injection istes and importance of rotating sites Pt states she has had a hx of type 2 diabetes for 6 years. Diet reviewed Pt eats a light breakfast at times, skips lunch and has a larger portion for dinner. Pt does also admit to not eating as much as she has in the past. Pt does not read food labels and has never measured portions of foods. Pt also does not check her blood sugar. Pt does admit to walking or riding a bike for about 1/2 hr. Pt never checked her blood sugar but does admit to experiencing symptoms of hypoglycemia. Pt was instructed to have a snack in the middel of the day especially if she is going to be more active. Pt instructed on diabetes self management skills including, BGM, how to use and insulin pen, location of injection sites and importance of rotating sites, healthy eating, ready Pt states she has had a hx of type 2 diabetes for 6 years. Diet reviewed Pt eats a light breakfast at times, skips lunch and has a larger portion for dinner. Pt does also admit to not eating as much as she has in the past. Pt does not read food labels and has never measured portions of foods. Pt also does not check her blood sugar. Pt does admit to walking or riding a bike for about 1/2 hr. Pt never checked her blood sugar but does admit to experiencing symptoms of hypoglycemia. Pt was instructed to have a snack in the middel of the day especially if she is going to be more active. Pt instructed on diabetes self management skills including, BGM, how to use and insulin pen, location of injection sites and importance of rotating sites, healthy eating, medications, signs and symptoms of hypoglycemia. Pt demonstrated understanding of information taught through teach back and proper use of an insulin pen. Pt has self injected insulin via a syringe while in the hospital.

## 2018-07-23 NOTE — DISCHARGE NOTE ADULT - CARE PROVIDER_API CALL
Kumar Peters), Surgery  [address]  [address]  [place] [address]  Phone: [phone]  Fax: [phone]    Nadine Feliciano (DO), EndocrinologyMetabDiabetes  [address] [place] [address]  Phone: [phone]  Fax: [phone]

## 2018-07-23 NOTE — PROGRESS NOTE ADULT - ASSESSMENT
72 year old female from home  with uncontrolled T2DM ,HbA1c - 12.1 presents with pancreatitis    Advise Patient on Discharge note:  - Hypoglycemia Management : Please check your fingersticks every morning or if you are not feeling well.  If your fingerstick is >300 mg/dl x 3 or more readings, please contact (provider) _Dr. Feliciano____. If your fingerstick low, <70 mg/dl and/or you have symptoms of very low blood sugar, FIRST drink 1/2 cup of apple juice, (or take 4 glucose tabs/tube of glucose gel) and recheck fingerstick in 15 minutes.  Repeat these steps until blood sugar is above 100 mg/dl, IF NECESSARY.  Then call provider listed above to discuss low blood sugar at (phone number)______________________________.    -What to expect at follow appointment:  Please bring a log of your fingersticks and/or your glucometer to your appointment.  Your blood sugar tracking will help your diabetes team determine the best plan.      Diabetes supplies to be orderd via E-prescribe:    (Please order at least 24 hours prior to discharge to avoid any unforseen insurance/discharge issues.)    Insulin regimen:        Additional Discharge recs:      - Alcohol pads  - Glucometer as per insurance  - Test strips  ________x/day  - Lancets      ________x/day  - BD zaira needles____Insulin syringe___    Discharge Appointments:  CDE appoitnment:  Provider appointment: 72 year old female from home  with uncontrolled T2DM ,HbA1c - 12.1 presents with pancreatitis    Advise Patient on Discharge note:  - Hypoglycemia Management : Please check your fingersticks every morning or if you are not feeling well.  If your fingerstick is >300 mg/dl x 3 or more readings, please contact (provider) _Dr. Feliciano____. If your fingerstick low, <70 mg/dl and/or you have symptoms of very low blood sugar, FIRST drink 1/2 cup of apple juice, (or take 4 glucose tabs/tube of glucose gel) and recheck fingerstick in 15 minutes.  Repeat these steps until blood sugar is above 100 mg/dl, IF NECESSARY.  Then call provider listed above to discuss low blood sugar at (phone number)___[phone]____.    -What to expect at follow appointment:  Please bring a log of your fingersticks and/or your glucometer to your appointment.  Your blood sugar tracking will help your diabetes team determine the best plan.      Diabetes supplies to be orderd via E-prescribe:    (Please order at least 24 hours prior to discharge to avoid any unforseen insurance/discharge issues.)    Insulin regimen:  	Lantus Solostar PEN 20 units sq qhs - please send prescription to the pharmacy and verify insurance coverage of basl insulin.  If Lantus is not covered, please try Basaglar, Levemir, Toujeo or Tresiba.    Additional Discharge recs:  	Prandin 1 mg po tid AC.        - Alcohol pads  - Glucometer as per insurance  - Test strips  __4__x/day  - Lancets      __4_x/day  - BD zaira needles___daily      Provider appointment:  Follow up with Dr. Diamond Gallardo on 9/13/18 at 2pm.  [address], [place], [place].  [phone]  ***************Patient will need a referral from Primary provider as required by HIP medicare insurance**************

## 2018-07-23 NOTE — DISCHARGE NOTE ADULT - HOSPITAL COURSE
Patient is a 72 y.o. woman with history of DM, CAD s/p stents (2003), HLD, and HTN who presented to the [facility] 7/19/18 with 2 weeks of increasing epigastric and LUQ abdominal pain.     The patient states that her pain began after she was started on a new medication, Metformin, by her primary care physician. The patient states that he pain slowly increased and she started to have associated abdominal distention and diarrhea. The patient also states that she began feeling SOB when walking long distances. The patient returned to her primary care physician 1 week ago and her dose of Metformin was decreased, which helped with her pain and other symptoms. However, she was finally prompted to come to the hospital due to the persistence of her symptoms.     CT abd/pelvis (7/19): Mild pancreatitis of the pancreatic head and uncinate process.    Patient was admitted to the surgical service, made NPO and started on IVF.     7/20: Urine culture growing E.Coli, + ciprofloxacin  7/21: Diet advanced to clear liquids. Endocrinology consulted for diabetes management and intolerance to Metformin.   7/22: Diet was advanced to regular with good tolerance.     Endocrine's recommendation for outpatient management were followed. Her pain level decreased. Pt currently ambulating, voiding, tolerating a regular diet. Patient is stable for discharge home to follow up as an outpatient, instructed to call to schedule appointment.

## 2018-07-23 NOTE — DISCHARGE NOTE ADULT - MEDICATION SUMMARY - MEDICATIONS TO STOP TAKING
I will STOP taking the medications listed below when I get home from the hospital:    metformin extended release 1000 mg oral tablet, extended release  -- 1 tab(s) by mouth 2 times a day    doxycycline monohydrate 100 mg oral capsule  -- 1 cap(s) by mouth every 12 hours

## 2018-07-23 NOTE — PROGRESS NOTE ADULT - PROBLEM SELECTOR PLAN 1
While inpatient - will c/w Lantus to 20 units daily and Humalog 5 units QAC.  pt tolertating diet  target bg 100-180 mg/dl    dc plan above.

## 2018-07-23 NOTE — PROGRESS NOTE ADULT - ASSESSMENT
Wendie Hobbs is a 72 y.o. woman with history of DM, CAD s/p stents (2003), HLD, and HTN who presented to the ED with 2 weeks of increasing epigastric and LUQ abdominal pain found to have acute pancreatitis. Pt w/ improved pain and HDS, tolerating diet.      Plan:   - Continue reg diet  - Pain management  - Serial abdominal exams  - seen by endoctrinology  - d/c today    Team B Surgery  [id number]

## 2018-07-23 NOTE — PROGRESS NOTE ADULT - SUBJECTIVE AND OBJECTIVE BOX
GENERAL SURGERY DAILY PROGRESS NOTE:     Subjective:      Patient was seen and examined this morning during morning rounds. She had moderate right sided abdominal pain overnight which is now improved. She is passing gas but no BMs. Tolerating reg diet. Denies N/V.       Objective:    NAD, awake and alert  Respirations nonlabored  Abdomen soft, nontender, nondistended  No guarding or rebound tenderness     MEDICATIONS  (STANDING):  aspirin 325 milliGRAM(s) Oral daily  ATENolol  Tablet 50 milliGRAM(s) Oral daily  ciprofloxacin     Tablet 500 milliGRAM(s) Oral every 12 hours  dextrose 5%. 1000 milliLiter(s) (50 mL/Hr) IV Continuous <Continuous>  dextrose 50% Injectable 12.5 Gram(s) IV Push once  dextrose 50% Injectable 25 Gram(s) IV Push once  dextrose 50% Injectable 25 Gram(s) IV Push once  enoxaparin Injectable 40 milliGRAM(s) SubCutaneous daily  hydrochlorothiazide 25 milliGRAM(s) Oral daily  insulin glargine Injectable (LANTUS) 20 Unit(s) SubCutaneous at bedtime  insulin lispro (HumaLOG) corrective regimen sliding scale   SubCutaneous three times a day before meals  insulin lispro (HumaLOG) corrective regimen sliding scale   SubCutaneous at bedtime  insulin lispro Injectable (HumaLOG) 5 Unit(s) SubCutaneous three times a day before meals  lisinopril 40 milliGRAM(s) Oral daily    MEDICATIONS  (PRN):  dextrose 40% Gel 15 Gram(s) Oral once PRN Blood Glucose LESS THAN 70 milliGRAM(s)/deciliter  glucagon  Injectable 1 milliGRAM(s) IntraMuscular once PRN Glucose LESS THAN 70 milligrams/deciliter  HYDROmorphone  Injectable 0.5 milliGRAM(s) IV Push every 4 hours PRN Moderate Pain (4 - 6)  HYDROmorphone  Injectable 1 milliGRAM(s) IV Push every 4 hours PRN Severe Pain (7 - 10)  ondansetron Injectable 4 milliGRAM(s) IV Push every 6 hours PRN Nausea and/or Vomiting      Vital Signs Last 24 Hrs  T(C): 36.6 (23 Jul 2018 05:45), Max: 36.8 (22 Jul 2018 17:26)  T(F): 97.9 (23 Jul 2018 05:45), Max: 98.2 (22 Jul 2018 17:26)  HR: 54 (23 Jul 2018 05:45) (54 - 62)  BP: 122/59 (23 Jul 2018 05:45) (122/59 - 141/69)  BP(mean): --  RR: 16 (23 Jul 2018 05:45) (16 - 18)  SpO2: 100% (23 Jul 2018 05:45) (100% - 100%)    I&O's Detail      Daily     Daily     LABS:                        11.8   5.88  )-----------( 187      ( 22 Jul 2018 06:55 )             35.8     07-22    139  |  103  |  9   ----------------------------<  233<H>  4.1   |  22  |  0.89    Ca    8.8      22 Jul 2018 06:55  Phos  2.0     07-22  Mg     1.8     07-22            RADIOLOGY & ADDITIONAL STUDIES:

## 2018-09-13 ENCOUNTER — APPOINTMENT (OUTPATIENT)
Dept: ENDOCRINOLOGY | Facility: CLINIC | Age: 73
End: 2018-09-13
Payer: MEDICARE

## 2018-09-13 VITALS
TEMPERATURE: 98.7 F | SYSTOLIC BLOOD PRESSURE: 113 MMHG | HEIGHT: 66 IN | HEART RATE: 58 BPM | WEIGHT: 194 LBS | BODY MASS INDEX: 31.18 KG/M2 | DIASTOLIC BLOOD PRESSURE: 72 MMHG

## 2018-09-13 DIAGNOSIS — E78.5 HYPERLIPIDEMIA, UNSPECIFIED: ICD-10-CM

## 2018-09-13 DIAGNOSIS — Z98.890 OTHER SPECIFIED POSTPROCEDURAL STATES: ICD-10-CM

## 2018-09-13 DIAGNOSIS — Z95.5 PRESENCE OF CORONARY ANGIOPLASTY IMPLANT AND GRAFT: ICD-10-CM

## 2018-09-13 DIAGNOSIS — H26.9 UNSPECIFIED CATARACT: ICD-10-CM

## 2018-09-13 DIAGNOSIS — Z83.3 FAMILY HISTORY OF DIABETES MELLITUS: ICD-10-CM

## 2018-09-13 DIAGNOSIS — K85.90 ACUTE PANCREATITIS WITHOUT NECROSIS OR INFECTION, UNSPECIFIED: ICD-10-CM

## 2018-09-13 DIAGNOSIS — E11.9 TYPE 2 DIABETES MELLITUS W/OUT COMPLICATIONS: ICD-10-CM

## 2018-09-13 DIAGNOSIS — I10 ESSENTIAL (PRIMARY) HYPERTENSION: ICD-10-CM

## 2018-09-13 LAB
GLUCOSE BLDC GLUCOMTR-MCNC: 255
HBA1C MFR BLD HPLC: 11.2

## 2018-09-13 PROCEDURE — 83036 HEMOGLOBIN GLYCOSYLATED A1C: CPT | Mod: QW

## 2018-09-13 PROCEDURE — 82962 GLUCOSE BLOOD TEST: CPT

## 2018-09-13 PROCEDURE — 99205 OFFICE O/P NEW HI 60 MIN: CPT | Mod: 25

## 2018-09-13 PROCEDURE — 36415 COLL VENOUS BLD VENIPUNCTURE: CPT

## 2018-09-14 PROBLEM — K85.90 PANCREATITIS: Status: ACTIVE | Noted: 2018-09-14

## 2018-09-14 PROBLEM — I10 HTN (HYPERTENSION): Status: ACTIVE | Noted: 2018-09-14

## 2018-09-14 PROBLEM — E78.5 HLD (HYPERLIPIDEMIA): Status: ACTIVE | Noted: 2018-09-14

## 2018-09-14 PROBLEM — Z95.5 PRESENCE OF STENT IN CORONARY ARTERY: Status: ACTIVE | Noted: 2018-09-14

## 2018-09-14 PROBLEM — Z98.890 S/P CORONARY ANGIOGRAM: Status: ACTIVE | Noted: 2018-09-14

## 2018-09-14 PROBLEM — H26.9 CATARACT: Status: ACTIVE | Noted: 2018-09-14

## 2018-09-14 PROBLEM — Z83.3 FAMILY HISTORY OF DIABETES MELLITUS: Status: ACTIVE | Noted: 2018-09-14

## 2018-09-14 RX ORDER — HYDROCHLOROTHIAZIDE 25 MG/1
25 TABLET ORAL DAILY
Qty: 30 | Refills: 4 | Status: ACTIVE | COMMUNITY
Start: 2018-09-14

## 2018-09-14 RX ORDER — GLIMEPIRIDE 2 MG/1
2 TABLET ORAL
Qty: 180 | Refills: 0 | Status: ACTIVE | COMMUNITY
Start: 2018-09-14

## 2018-09-14 RX ORDER — RAMIPRIL 10 MG/1
10 CAPSULE ORAL DAILY
Qty: 30 | Refills: 4 | Status: ACTIVE | COMMUNITY
Start: 2018-09-14

## 2018-09-14 RX ORDER — ATENOLOL 100 MG/1
100 TABLET ORAL DAILY
Refills: 0 | Status: ACTIVE | COMMUNITY
Start: 2018-09-14

## 2018-09-14 RX ORDER — SIMVASTATIN 40 MG/1
40 TABLET, FILM COATED ORAL DAILY
Qty: 30 | Refills: 3 | Status: ACTIVE | COMMUNITY
Start: 2018-09-14

## 2018-09-14 RX ORDER — OMEPRAZOLE 20 MG/1
20 CAPSULE, DELAYED RELEASE ORAL
Refills: 0 | Status: ACTIVE | COMMUNITY
Start: 2018-09-14

## 2018-09-20 ENCOUNTER — RX RENEWAL (OUTPATIENT)
Age: 73
End: 2018-09-20

## 2018-09-20 RX ORDER — INSULIN GLARGINE 100 [IU]/ML
100 INJECTION, SOLUTION SUBCUTANEOUS
Qty: 1 | Refills: 3 | Status: ACTIVE | COMMUNITY
Start: 2018-09-14 | End: 1900-01-01

## 2018-09-21 ENCOUNTER — RESULT REVIEW (OUTPATIENT)
Age: 73
End: 2018-09-21

## 2018-09-21 LAB
ALBUMIN SERPL ELPH-MCNC: 4.1 G/DL
ALP BLD-CCNC: 83 U/L
ALT SERPL-CCNC: 31 U/L
ANION GAP SERPL CALC-SCNC: 15 MMOL/L
AST SERPL-CCNC: 22 U/L
BILIRUB SERPL-MCNC: 0.5 MG/DL
BUN SERPL-MCNC: 20 MG/DL
C PEPTIDE SERPL-MCNC: 4.3 NG/ML
CALCIUM SERPL-MCNC: 9.8 MG/DL
CHLORIDE SERPL-SCNC: 101 MMOL/L
CO2 SERPL-SCNC: 26 MMOL/L
CREAT SERPL-MCNC: 1.24 MG/DL
CREAT SPEC-SCNC: 225 MG/DL
GLUCOSE SERPL-MCNC: 267 MG/DL
MICROALBUMIN 24H UR DL<=1MG/L-MCNC: 1.8 MG/DL
MICROALBUMIN/CREAT 24H UR-RTO: 8 MG/G
POTASSIUM SERPL-SCNC: 4.6 MMOL/L
PROT SERPL-MCNC: 7.5 G/DL
SODIUM SERPL-SCNC: 142 MMOL/L
T4 FREE SERPL-MCNC: 1.1 NG/DL
THYROGLOB AB SERPL-ACNC: <20 IU/ML
THYROPEROXIDASE AB SERPL IA-ACNC: <10 IU/ML
TSH SERPL-ACNC: 1.52 UIU/ML

## 2018-10-03 ENCOUNTER — RX RENEWAL (OUTPATIENT)
Age: 73
End: 2018-10-03

## 2018-10-09 ENCOUNTER — OTHER (OUTPATIENT)
Age: 73
End: 2018-10-09

## 2018-10-09 RX ORDER — LANCETS 28 GAUGE
EACH MISCELLANEOUS
Qty: 2 | Refills: 3 | Status: ACTIVE | COMMUNITY
Start: 2018-10-09 | End: 1900-01-01

## 2018-10-11 ENCOUNTER — RX RENEWAL (OUTPATIENT)
Age: 73
End: 2018-10-11

## 2018-10-11 RX ORDER — BLOOD-GLUCOSE METER
W/DEVICE EACH MISCELLANEOUS
Qty: 1 | Refills: 0 | Status: ACTIVE | COMMUNITY
Start: 2018-10-11 | End: 1900-01-01

## 2018-10-11 RX ORDER — BLOOD SUGAR DIAGNOSTIC
STRIP MISCELLANEOUS
Qty: 2 | Refills: 4 | Status: ACTIVE | COMMUNITY
Start: 2018-10-11 | End: 1900-01-01

## 2018-10-11 RX ORDER — LANCETS 30 GAUGE
EACH MISCELLANEOUS
Qty: 2 | Refills: 4 | Status: ACTIVE | COMMUNITY
Start: 2018-10-11 | End: 1900-01-01

## 2018-11-19 ENCOUNTER — APPOINTMENT (OUTPATIENT)
Dept: CHRONIC DISEASE MANAGEMENT | Facility: CLINIC | Age: 73
End: 2018-11-19

## 2018-12-17 RX ORDER — INSULIN LISPRO 100 [IU]/ML
100 INJECTION, SOLUTION INTRAVENOUS; SUBCUTANEOUS
Qty: 1 | Refills: 3 | Status: ACTIVE | COMMUNITY
Start: 2018-09-13 | End: 1900-01-01

## 2019-07-03 ENCOUNTER — OTHER (OUTPATIENT)
Age: 74
End: 2019-07-03

## 2019-07-03 RX ORDER — PEN NEEDLE, DIABETIC 29 G X1/2"
32G X 4 MM NEEDLE, DISPOSABLE MISCELLANEOUS
Qty: 2 | Refills: 3 | Status: ACTIVE | COMMUNITY
Start: 2018-09-13 | End: 1900-01-01

## 2020-07-07 RX ORDER — BLOOD SUGAR DIAGNOSTIC
STRIP MISCELLANEOUS
Qty: 6 | Refills: 0 | Status: ACTIVE | COMMUNITY
Start: 2018-10-09 | End: 1900-01-01

## 2023-05-10 NOTE — DISCHARGE NOTE ADULT - MEDICATION SUMMARY - MEDICATIONS TO CHANGE
INDICATION: Back pain. History of scoliosis.



COMPARISON: 06/16/2017



FINDINGS: Frontal radiographic views of the thoracic and lumbar

spine were obtained and again show S-shaped scoliotic deformity

of the thoracic and lumbar spine. Mild dextro scoliotic deformity

is noted epicentered at the T11 level and measures approximately

11 degrees. This is stable. There is also mild to moderate

levoscoliotic deformity epicentered at L2. This measures

approximately 20 degrees and has progressed since previous exam.

Multilevel degenerative changes are noted.



Included portions of the lungs are clear. Included small bowel

loops are nondilated.



IMPRESSION:

1. S-shaped scoliotic deformity of the thoracolumbar spine as

described above. Again, levoscoliotic component of the lumbar

spine appears progressed.



Dictated by: 



  Dictated on workstation # [id number] I will SWITCH the dose or number of times a day I take the medications listed below when I get home from the hospital:  None

## 2024-01-01 NOTE — PROGRESS NOTE ADULT - ASSESSMENT
Wendie Hobbs is a 72 y.o. woman with history of DM, CAD s/p stents (2003), HLD, and HTN who presented to the ED with 2 weeks of increasing epigastric and LUQ abdominal pain found to have acute pancreatitis. Pt w/ improved pain and HDS.     Plan:   - cld  - IVF  - Pain management  - Serial abdominal exams  - f/u endo    Team B Surgery  [id number]